# Patient Record
Sex: FEMALE | Race: WHITE | NOT HISPANIC OR LATINO | Employment: OTHER | URBAN - METROPOLITAN AREA
[De-identification: names, ages, dates, MRNs, and addresses within clinical notes are randomized per-mention and may not be internally consistent; named-entity substitution may affect disease eponyms.]

---

## 2017-07-13 ENCOUNTER — HOSPITAL ENCOUNTER (OUTPATIENT)
Dept: RADIOLOGY | Facility: MEDICAL CENTER | Age: 67
End: 2017-07-13
Attending: FAMILY MEDICINE
Payer: COMMERCIAL

## 2017-07-13 DIAGNOSIS — R60.0 LOCALIZED EDEMA: ICD-10-CM

## 2017-07-13 PROCEDURE — 93970 EXTREMITY STUDY: CPT

## 2017-08-26 ENCOUNTER — HOSPITAL ENCOUNTER (OUTPATIENT)
Dept: RADIOLOGY | Facility: MEDICAL CENTER | Age: 67
End: 2017-08-26
Attending: ORTHOPAEDIC SURGERY
Payer: COMMERCIAL

## 2017-08-26 DIAGNOSIS — M48.02 SPINAL STENOSIS IN CERVICAL REGION: ICD-10-CM

## 2017-08-26 DIAGNOSIS — M54.2 CERVICALGIA: ICD-10-CM

## 2017-08-26 PROCEDURE — 72141 MRI NECK SPINE W/O DYE: CPT

## 2018-03-02 ENCOUNTER — HOSPITAL ENCOUNTER (OUTPATIENT)
Dept: RADIOLOGY | Facility: MEDICAL CENTER | Age: 68
End: 2018-03-02
Attending: OBSTETRICS & GYNECOLOGY
Payer: COMMERCIAL

## 2018-03-02 ENCOUNTER — HOSPITAL ENCOUNTER (OUTPATIENT)
Dept: LAB | Facility: MEDICAL CENTER | Age: 68
End: 2018-03-02
Attending: FAMILY MEDICINE
Payer: COMMERCIAL

## 2018-03-02 DIAGNOSIS — Z12.31 VISIT FOR SCREENING MAMMOGRAM: ICD-10-CM

## 2018-03-02 LAB
ALBUMIN SERPL BCP-MCNC: 4.4 G/DL (ref 3.2–4.9)
ALBUMIN/GLOB SERPL: 2 G/DL
ALP SERPL-CCNC: 44 U/L (ref 30–99)
ALT SERPL-CCNC: 19 U/L (ref 2–50)
ANION GAP SERPL CALC-SCNC: 3 MMOL/L (ref 0–11.9)
AST SERPL-CCNC: 16 U/L (ref 12–45)
BASOPHILS # BLD AUTO: 0.5 % (ref 0–1.8)
BASOPHILS # BLD: 0.02 K/UL (ref 0–0.12)
BILIRUB SERPL-MCNC: 0.4 MG/DL (ref 0.1–1.5)
BNP SERPL-MCNC: 14 PG/ML (ref 0–100)
BUN SERPL-MCNC: 29 MG/DL (ref 8–22)
CALCIUM SERPL-MCNC: 8.9 MG/DL (ref 8.5–10.5)
CHLORIDE SERPL-SCNC: 110 MMOL/L (ref 96–112)
CHOLEST SERPL-MCNC: 267 MG/DL (ref 100–199)
CO2 SERPL-SCNC: 24 MMOL/L (ref 20–33)
CREAT SERPL-MCNC: 0.72 MG/DL (ref 0.5–1.4)
CRP SERPL HS-MCNC: 0.37 MG/DL (ref 0–0.75)
DEPRECATED D DIMER PPP IA-ACNC: <200 NG/ML(D-DU)
EOSINOPHIL # BLD AUTO: 0.16 K/UL (ref 0–0.51)
EOSINOPHIL NFR BLD: 3.6 % (ref 0–6.9)
ERYTHROCYTE [DISTWIDTH] IN BLOOD BY AUTOMATED COUNT: 45 FL (ref 35.9–50)
ERYTHROCYTE [SEDIMENTATION RATE] IN BLOOD BY WESTERGREN METHOD: 12 MM/HOUR (ref 0–30)
EST. AVERAGE GLUCOSE BLD GHB EST-MCNC: 105 MG/DL
GLOBULIN SER CALC-MCNC: 2.2 G/DL (ref 1.9–3.5)
GLUCOSE SERPL-MCNC: 113 MG/DL (ref 65–99)
HBA1C MFR BLD: 5.3 % (ref 0–5.6)
HCT VFR BLD AUTO: 38.4 % (ref 37–47)
HDLC SERPL-MCNC: 51 MG/DL
HGB BLD-MCNC: 12.8 G/DL (ref 12–16)
IMM GRANULOCYTES # BLD AUTO: 0.02 K/UL (ref 0–0.11)
IMM GRANULOCYTES NFR BLD AUTO: 0.5 % (ref 0–0.9)
LDLC SERPL CALC-MCNC: 184 MG/DL
LYMPHOCYTES # BLD AUTO: 1.52 K/UL (ref 1–4.8)
LYMPHOCYTES NFR BLD: 34.5 % (ref 22–41)
MCH RBC QN AUTO: 31.3 PG (ref 27–33)
MCHC RBC AUTO-ENTMCNC: 33.3 G/DL (ref 33.6–35)
MCV RBC AUTO: 93.9 FL (ref 81.4–97.8)
MONOCYTES # BLD AUTO: 0.3 K/UL (ref 0–0.85)
MONOCYTES NFR BLD AUTO: 6.8 % (ref 0–13.4)
NEUTROPHILS # BLD AUTO: 2.39 K/UL (ref 2–7.15)
NEUTROPHILS NFR BLD: 54.1 % (ref 44–72)
NRBC # BLD AUTO: 0 K/UL
NRBC BLD-RTO: 0 /100 WBC
PLATELET # BLD AUTO: 220 K/UL (ref 164–446)
PMV BLD AUTO: 10.1 FL (ref 9–12.9)
POTASSIUM SERPL-SCNC: 4.1 MMOL/L (ref 3.6–5.5)
PREALB SERPL-MCNC: 26 MG/DL (ref 18–38)
PROT SERPL-MCNC: 6.6 G/DL (ref 6–8.2)
RBC # BLD AUTO: 4.09 M/UL (ref 4.2–5.4)
SODIUM SERPL-SCNC: 137 MMOL/L (ref 135–145)
T4 FREE SERPL-MCNC: 0.65 NG/DL (ref 0.53–1.43)
TRIGL SERPL-MCNC: 159 MG/DL (ref 0–149)
TSH SERPL DL<=0.005 MIU/L-ACNC: 2.49 UIU/ML (ref 0.38–5.33)
WBC # BLD AUTO: 4.4 K/UL (ref 4.8–10.8)

## 2018-03-02 PROCEDURE — 36415 COLL VENOUS BLD VENIPUNCTURE: CPT

## 2018-03-02 PROCEDURE — 85379 FIBRIN DEGRADATION QUANT: CPT

## 2018-03-02 PROCEDURE — 84443 ASSAY THYROID STIM HORMONE: CPT

## 2018-03-02 PROCEDURE — 77067 SCR MAMMO BI INCL CAD: CPT

## 2018-03-02 PROCEDURE — 83036 HEMOGLOBIN GLYCOSYLATED A1C: CPT

## 2018-03-02 PROCEDURE — 83880 ASSAY OF NATRIURETIC PEPTIDE: CPT

## 2018-03-02 PROCEDURE — 84134 ASSAY OF PREALBUMIN: CPT

## 2018-03-02 PROCEDURE — 85652 RBC SED RATE AUTOMATED: CPT

## 2018-03-02 PROCEDURE — 84439 ASSAY OF FREE THYROXINE: CPT

## 2018-03-02 PROCEDURE — 80061 LIPID PANEL: CPT

## 2018-03-02 PROCEDURE — 85025 COMPLETE CBC W/AUTO DIFF WBC: CPT

## 2018-03-02 PROCEDURE — 85362 FIBRIN DEGRADATION PRODUCTS: CPT

## 2018-03-02 PROCEDURE — 80053 COMPREHEN METABOLIC PANEL: CPT

## 2018-03-02 PROCEDURE — 86140 C-REACTIVE PROTEIN: CPT

## 2018-03-04 LAB — FSP PPP-MCNC: <5 UG/ML

## 2018-03-16 ENCOUNTER — HOSPITAL ENCOUNTER (OUTPATIENT)
Dept: CARDIOLOGY | Facility: MEDICAL CENTER | Age: 68
End: 2018-03-16
Attending: FAMILY MEDICINE
Payer: COMMERCIAL

## 2018-03-16 ENCOUNTER — HOSPITAL ENCOUNTER (OUTPATIENT)
Dept: RADIOLOGY | Facility: MEDICAL CENTER | Age: 68
End: 2018-03-16
Attending: FAMILY MEDICINE
Payer: COMMERCIAL

## 2018-03-16 DIAGNOSIS — M25.552 LEFT HIP PAIN: ICD-10-CM

## 2018-03-16 DIAGNOSIS — R60.0 LOCALIZED EDEMA: ICD-10-CM

## 2018-03-16 DIAGNOSIS — G57.90 MONONEURITIS OF LOWER EXTREMITY, UNSPECIFIED LATERALITY: ICD-10-CM

## 2018-03-16 DIAGNOSIS — R60.1 ANASARCA: ICD-10-CM

## 2018-03-16 DIAGNOSIS — G57.92 MONONEURITIS OF LEFT LOWER EXTREMITY: ICD-10-CM

## 2018-03-16 DIAGNOSIS — R60.9 EDEMA, UNSPECIFIED TYPE: ICD-10-CM

## 2018-03-16 LAB
LV EJECT FRACT  99904: 60
LV EJECT FRACT MOD 2C 99903: 57.31
LV EJECT FRACT MOD 4C 99902: 70.08
LV EJECT FRACT MOD BP 99901: 62.31

## 2018-03-16 PROCEDURE — 93306 TTE W/DOPPLER COMPLETE: CPT

## 2018-03-16 PROCEDURE — 73502 X-RAY EXAM HIP UNI 2-3 VIEWS: CPT | Mod: LT

## 2018-03-16 PROCEDURE — 93922 UPR/L XTREMITY ART 2 LEVELS: CPT

## 2018-04-11 ENCOUNTER — HOSPITAL ENCOUNTER (OUTPATIENT)
Dept: RADIOLOGY | Facility: MEDICAL CENTER | Age: 68
End: 2018-04-11
Attending: FAMILY MEDICINE
Payer: COMMERCIAL

## 2018-04-11 ENCOUNTER — HOSPITAL ENCOUNTER (OUTPATIENT)
Dept: LAB | Facility: MEDICAL CENTER | Age: 68
End: 2018-04-11
Attending: FAMILY MEDICINE
Payer: COMMERCIAL

## 2018-04-11 DIAGNOSIS — E07.9 DISEASE OF THYROID GLAND: ICD-10-CM

## 2018-04-11 LAB
FERRITIN SERPL-MCNC: 11.9 NG/ML (ref 10–291)
FOLATE SERPL-MCNC: 10.7 NG/ML
IRON SERPL-MCNC: 74 UG/DL (ref 40–170)
T3FREE SERPL-MCNC: 3.12 PG/ML (ref 2.4–4.2)
T4 FREE SERPL-MCNC: 0.72 NG/DL (ref 0.53–1.43)
T4 SERPL-MCNC: 4.8 UG/DL (ref 4–12)
THYROPEROXIDASE AB SERPL-ACNC: 0.6 IU/ML (ref 0–9)
TSH SERPL DL<=0.005 MIU/L-ACNC: 3.82 UIU/ML (ref 0.38–5.33)
VIT B12 SERPL-MCNC: 292 PG/ML (ref 211–911)

## 2018-04-11 PROCEDURE — 83540 ASSAY OF IRON: CPT

## 2018-04-11 PROCEDURE — 84479 ASSAY OF THYROID (T3 OR T4): CPT

## 2018-04-11 PROCEDURE — 82746 ASSAY OF FOLIC ACID SERUM: CPT

## 2018-04-11 PROCEDURE — 76536 US EXAM OF HEAD AND NECK: CPT

## 2018-04-11 PROCEDURE — 84443 ASSAY THYROID STIM HORMONE: CPT

## 2018-04-11 PROCEDURE — 82607 VITAMIN B-12: CPT

## 2018-04-11 PROCEDURE — 82530 CORTISOL FREE: CPT

## 2018-04-11 PROCEDURE — 82728 ASSAY OF FERRITIN: CPT

## 2018-04-11 PROCEDURE — 86376 MICROSOMAL ANTIBODY EACH: CPT

## 2018-04-11 PROCEDURE — 83520 IMMUNOASSAY QUANT NOS NONAB: CPT

## 2018-04-11 PROCEDURE — 84481 FREE ASSAY (FT-3): CPT

## 2018-04-11 PROCEDURE — 84207 ASSAY OF VITAMIN B-6: CPT

## 2018-04-11 PROCEDURE — 82024 ASSAY OF ACTH: CPT

## 2018-04-11 PROCEDURE — 36415 COLL VENOUS BLD VENIPUNCTURE: CPT

## 2018-04-11 PROCEDURE — 84439 ASSAY OF FREE THYROXINE: CPT

## 2018-04-13 LAB
ACTH PLAS-MCNC: 6 PG/ML (ref 6–58)
T3RU NFR SERPL: 36 % (ref 28–41)
TSH RECEP AB SER-ACNC: <0.9 IU/L

## 2018-04-15 LAB — MISCELLANEOUS LAB RESULT MISCLAB: NORMAL

## 2018-04-17 LAB — VIT B6 SERPL-MCNC: 30.5 NMOL/L (ref 20–125)

## 2018-06-14 ENCOUNTER — APPOINTMENT (OUTPATIENT)
Dept: RADIOLOGY | Facility: MEDICAL CENTER | Age: 68
End: 2018-06-14
Attending: EMERGENCY MEDICINE
Payer: COMMERCIAL

## 2018-06-14 ENCOUNTER — HOSPITAL ENCOUNTER (OUTPATIENT)
Facility: MEDICAL CENTER | Age: 68
End: 2018-06-15
Attending: EMERGENCY MEDICINE | Admitting: HOSPITALIST
Payer: COMMERCIAL

## 2018-06-14 DIAGNOSIS — R07.9 CHEST PAIN, UNSPECIFIED TYPE: ICD-10-CM

## 2018-06-14 PROBLEM — F32.A DEPRESSION: Status: ACTIVE | Noted: 2018-06-14

## 2018-06-14 PROBLEM — K21.9 GERD (GASTROESOPHAGEAL REFLUX DISEASE): Status: ACTIVE | Noted: 2018-06-14

## 2018-06-14 PROBLEM — E78.5 HLD (HYPERLIPIDEMIA): Status: ACTIVE | Noted: 2018-06-14

## 2018-06-14 LAB
ALBUMIN SERPL BCP-MCNC: 4.2 G/DL (ref 3.2–4.9)
ALBUMIN/GLOB SERPL: 1.4 G/DL
ALP SERPL-CCNC: 47 U/L (ref 30–99)
ALT SERPL-CCNC: 21 U/L (ref 2–50)
ANION GAP SERPL CALC-SCNC: 10 MMOL/L (ref 0–11.9)
APTT PPP: 24.1 SEC (ref 24.7–36)
AST SERPL-CCNC: 21 U/L (ref 12–45)
BASOPHILS # BLD AUTO: 0.5 % (ref 0–1.8)
BASOPHILS # BLD: 0.03 K/UL (ref 0–0.12)
BILIRUB SERPL-MCNC: 0.4 MG/DL (ref 0.1–1.5)
BNP SERPL-MCNC: 17 PG/ML (ref 0–100)
BUN SERPL-MCNC: 22 MG/DL (ref 8–22)
CALCIUM SERPL-MCNC: 9.5 MG/DL (ref 8.5–10.5)
CHLORIDE SERPL-SCNC: 105 MMOL/L (ref 96–112)
CO2 SERPL-SCNC: 23 MMOL/L (ref 20–33)
CREAT SERPL-MCNC: 0.76 MG/DL (ref 0.5–1.4)
DEPRECATED D DIMER PPP IA-ACNC: <200 NG/ML(D-DU)
EKG IMPRESSION: NORMAL
EKG IMPRESSION: NORMAL
EOSINOPHIL # BLD AUTO: 0.17 K/UL (ref 0–0.51)
EOSINOPHIL NFR BLD: 2.8 % (ref 0–6.9)
ERYTHROCYTE [DISTWIDTH] IN BLOOD BY AUTOMATED COUNT: 47.6 FL (ref 35.9–50)
EST. AVERAGE GLUCOSE BLD GHB EST-MCNC: 114 MG/DL
GLOBULIN SER CALC-MCNC: 3 G/DL (ref 1.9–3.5)
GLUCOSE SERPL-MCNC: 105 MG/DL (ref 65–99)
HBA1C MFR BLD: 5.6 % (ref 0–5.6)
HCT VFR BLD AUTO: 38 % (ref 37–47)
HGB BLD-MCNC: 12.8 G/DL (ref 12–16)
IMM GRANULOCYTES # BLD AUTO: 0.02 K/UL (ref 0–0.11)
IMM GRANULOCYTES NFR BLD AUTO: 0.3 % (ref 0–0.9)
INR PPP: 0.97 (ref 0.87–1.13)
LIPASE SERPL-CCNC: 34 U/L (ref 11–82)
LYMPHOCYTES # BLD AUTO: 1.79 K/UL (ref 1–4.8)
LYMPHOCYTES NFR BLD: 29.7 % (ref 22–41)
MCH RBC QN AUTO: 31.5 PG (ref 27–33)
MCHC RBC AUTO-ENTMCNC: 33.7 G/DL (ref 33.6–35)
MCV RBC AUTO: 93.6 FL (ref 81.4–97.8)
MONOCYTES # BLD AUTO: 0.43 K/UL (ref 0–0.85)
MONOCYTES NFR BLD AUTO: 7.1 % (ref 0–13.4)
NEUTROPHILS # BLD AUTO: 3.59 K/UL (ref 2–7.15)
NEUTROPHILS NFR BLD: 59.6 % (ref 44–72)
NRBC # BLD AUTO: 0 K/UL
NRBC BLD-RTO: 0 /100 WBC
PLATELET # BLD AUTO: 226 K/UL (ref 164–446)
PMV BLD AUTO: 9.8 FL (ref 9–12.9)
POTASSIUM SERPL-SCNC: 4.3 MMOL/L (ref 3.6–5.5)
PROT SERPL-MCNC: 7.2 G/DL (ref 6–8.2)
PROTHROMBIN TIME: 12.6 SEC (ref 12–14.6)
RBC # BLD AUTO: 4.06 M/UL (ref 4.2–5.4)
SODIUM SERPL-SCNC: 138 MMOL/L (ref 135–145)
TROPONIN I SERPL-MCNC: <0.01 NG/ML (ref 0–0.04)
TSH SERPL DL<=0.005 MIU/L-ACNC: 3.35 UIU/ML (ref 0.38–5.33)
WBC # BLD AUTO: 5.8 K/UL (ref 4.8–10.8)

## 2018-06-14 PROCEDURE — 83880 ASSAY OF NATRIURETIC PEPTIDE: CPT

## 2018-06-14 PROCEDURE — 83036 HEMOGLOBIN GLYCOSYLATED A1C: CPT

## 2018-06-14 PROCEDURE — 85025 COMPLETE CBC W/AUTO DIFF WBC: CPT

## 2018-06-14 PROCEDURE — 93005 ELECTROCARDIOGRAM TRACING: CPT

## 2018-06-14 PROCEDURE — 93005 ELECTROCARDIOGRAM TRACING: CPT | Performed by: EMERGENCY MEDICINE

## 2018-06-14 PROCEDURE — 80053 COMPREHEN METABOLIC PANEL: CPT

## 2018-06-14 PROCEDURE — 93005 ELECTROCARDIOGRAM TRACING: CPT | Performed by: HOSPITALIST

## 2018-06-14 PROCEDURE — 93010 ELECTROCARDIOGRAM REPORT: CPT | Performed by: INTERNAL MEDICINE

## 2018-06-14 PROCEDURE — 99285 EMERGENCY DEPT VISIT HI MDM: CPT

## 2018-06-14 PROCEDURE — A9270 NON-COVERED ITEM OR SERVICE: HCPCS | Performed by: EMERGENCY MEDICINE

## 2018-06-14 PROCEDURE — 84443 ASSAY THYROID STIM HORMONE: CPT

## 2018-06-14 PROCEDURE — G0378 HOSPITAL OBSERVATION PER HR: HCPCS

## 2018-06-14 PROCEDURE — 85730 THROMBOPLASTIN TIME PARTIAL: CPT

## 2018-06-14 PROCEDURE — 99219 PR INITIAL OBSERVATION CARE,LEVL II: CPT | Performed by: HOSPITALIST

## 2018-06-14 PROCEDURE — A9270 NON-COVERED ITEM OR SERVICE: HCPCS | Performed by: HOSPITALIST

## 2018-06-14 PROCEDURE — 84484 ASSAY OF TROPONIN QUANT: CPT

## 2018-06-14 PROCEDURE — 83690 ASSAY OF LIPASE: CPT

## 2018-06-14 PROCEDURE — 85379 FIBRIN DEGRADATION QUANT: CPT

## 2018-06-14 PROCEDURE — 71045 X-RAY EXAM CHEST 1 VIEW: CPT

## 2018-06-14 PROCEDURE — 85610 PROTHROMBIN TIME: CPT

## 2018-06-14 PROCEDURE — 36415 COLL VENOUS BLD VENIPUNCTURE: CPT

## 2018-06-14 PROCEDURE — 700102 HCHG RX REV CODE 250 W/ 637 OVERRIDE(OP): Performed by: HOSPITALIST

## 2018-06-14 PROCEDURE — 700102 HCHG RX REV CODE 250 W/ 637 OVERRIDE(OP): Performed by: EMERGENCY MEDICINE

## 2018-06-14 RX ORDER — ONDANSETRON 4 MG/1
4 TABLET, ORALLY DISINTEGRATING ORAL EVERY 4 HOURS PRN
Status: DISCONTINUED | OUTPATIENT
Start: 2018-06-14 | End: 2018-06-15 | Stop reason: HOSPADM

## 2018-06-14 RX ORDER — VITAMIN E 268 MG
400 CAPSULE ORAL DAILY
COMMUNITY
End: 2019-10-23

## 2018-06-14 RX ORDER — LANOLIN ALCOHOL/MO/W.PET/CERES
400 CREAM (GRAM) TOPICAL DAILY
COMMUNITY
End: 2019-10-23

## 2018-06-14 RX ORDER — HEPARIN SODIUM 5000 [USP'U]/ML
5000 INJECTION, SOLUTION INTRAVENOUS; SUBCUTANEOUS EVERY 8 HOURS
Status: DISCONTINUED | OUTPATIENT
Start: 2018-06-14 | End: 2018-06-15 | Stop reason: HOSPADM

## 2018-06-14 RX ORDER — CHLORAL HYDRATE 500 MG
1000 CAPSULE ORAL DAILY
COMMUNITY
End: 2019-10-23

## 2018-06-14 RX ORDER — POLYETHYLENE GLYCOL 3350 17 G/17G
1 POWDER, FOR SOLUTION ORAL
Status: DISCONTINUED | OUTPATIENT
Start: 2018-06-14 | End: 2018-06-15 | Stop reason: HOSPADM

## 2018-06-14 RX ORDER — ASPIRIN 300 MG/1
300 SUPPOSITORY RECTAL DAILY
Status: DISCONTINUED | OUTPATIENT
Start: 2018-06-15 | End: 2018-06-15 | Stop reason: HOSPADM

## 2018-06-14 RX ORDER — OXYCODONE HYDROCHLORIDE 5 MG/1
5 TABLET ORAL
Status: DISCONTINUED | OUTPATIENT
Start: 2018-06-14 | End: 2018-06-15 | Stop reason: HOSPADM

## 2018-06-14 RX ORDER — RANITIDINE 150 MG/1
150 TABLET ORAL
COMMUNITY
End: 2019-10-23

## 2018-06-14 RX ORDER — UBIDECARENONE 75 MG
100 CAPSULE ORAL DAILY
COMMUNITY
End: 2019-10-23

## 2018-06-14 RX ORDER — FERROUS SULFATE 325(65) MG
325 TABLET ORAL DAILY
Status: DISCONTINUED | OUTPATIENT
Start: 2018-06-15 | End: 2018-06-15 | Stop reason: HOSPADM

## 2018-06-14 RX ORDER — FAMOTIDINE 20 MG/1
20 TABLET, FILM COATED ORAL
Status: DISCONTINUED | OUTPATIENT
Start: 2018-06-14 | End: 2018-06-15 | Stop reason: HOSPADM

## 2018-06-14 RX ORDER — HYDROMORPHONE HYDROCHLORIDE 2 MG/ML
0.5 INJECTION, SOLUTION INTRAMUSCULAR; INTRAVENOUS; SUBCUTANEOUS
Status: DISCONTINUED | OUTPATIENT
Start: 2018-06-14 | End: 2018-06-15 | Stop reason: HOSPADM

## 2018-06-14 RX ORDER — AMOXICILLIN 250 MG
2 CAPSULE ORAL 2 TIMES DAILY
Status: DISCONTINUED | OUTPATIENT
Start: 2018-06-14 | End: 2018-06-15 | Stop reason: HOSPADM

## 2018-06-14 RX ORDER — SERTRALINE HYDROCHLORIDE 100 MG/1
100 TABLET, FILM COATED ORAL DAILY
Status: DISCONTINUED | OUTPATIENT
Start: 2018-06-15 | End: 2018-06-15 | Stop reason: HOSPADM

## 2018-06-14 RX ORDER — BISACODYL 10 MG
10 SUPPOSITORY, RECTAL RECTAL
Status: DISCONTINUED | OUTPATIENT
Start: 2018-06-14 | End: 2018-06-15 | Stop reason: HOSPADM

## 2018-06-14 RX ORDER — FERROUS SULFATE 325(65) MG
325 TABLET ORAL DAILY
COMMUNITY
End: 2019-10-23

## 2018-06-14 RX ORDER — ASPIRIN 325 MG
325 TABLET ORAL DAILY
Status: DISCONTINUED | OUTPATIENT
Start: 2018-06-15 | End: 2018-06-15 | Stop reason: HOSPADM

## 2018-06-14 RX ORDER — ASPIRIN 300 MG/1
300 SUPPOSITORY RECTAL ONCE
Status: COMPLETED | OUTPATIENT
Start: 2018-06-14 | End: 2018-06-14

## 2018-06-14 RX ORDER — ONDANSETRON 2 MG/ML
4 INJECTION INTRAMUSCULAR; INTRAVENOUS EVERY 4 HOURS PRN
Status: DISCONTINUED | OUTPATIENT
Start: 2018-06-14 | End: 2018-06-15 | Stop reason: HOSPADM

## 2018-06-14 RX ORDER — ASPIRIN 81 MG/1
324 TABLET, CHEWABLE ORAL ONCE
Status: COMPLETED | OUTPATIENT
Start: 2018-06-14 | End: 2018-06-14

## 2018-06-14 RX ORDER — OXYCODONE HYDROCHLORIDE 10 MG/1
10 TABLET ORAL
Status: DISCONTINUED | OUTPATIENT
Start: 2018-06-14 | End: 2018-06-15 | Stop reason: HOSPADM

## 2018-06-14 RX ORDER — RANITIDINE 150 MG/1
150 TABLET ORAL
Status: DISCONTINUED | OUTPATIENT
Start: 2018-06-14 | End: 2018-06-14

## 2018-06-14 RX ORDER — ASPIRIN 81 MG/1
324 TABLET, CHEWABLE ORAL DAILY
Status: DISCONTINUED | OUTPATIENT
Start: 2018-06-15 | End: 2018-06-15 | Stop reason: HOSPADM

## 2018-06-14 RX ADMIN — LIDOCAINE HYDROCHLORIDE 15 ML: 20 SOLUTION OROPHARYNGEAL at 14:30

## 2018-06-14 RX ADMIN — ASPIRIN 324 MG: 81 TABLET, CHEWABLE ORAL at 11:31

## 2018-06-14 ASSESSMENT — ENCOUNTER SYMPTOMS
FOCAL WEAKNESS: 0
SPEECH CHANGE: 0
DIZZINESS: 0
CHILLS: 0
VOMITING: 0
BRUISES/BLEEDS EASILY: 0
DEPRESSION: 0
COUGH: 0
HEARTBURN: 1
FLANK PAIN: 0
PALPITATIONS: 0
MYALGIAS: 0
FEVER: 0
EYE DISCHARGE: 0
SENSORY CHANGE: 0
NAUSEA: 1
WEAKNESS: 0
BLURRED VISION: 0
HALLUCINATIONS: 0
ABDOMINAL PAIN: 0
SHORTNESS OF BREATH: 0
HEMOPTYSIS: 0
DOUBLE VISION: 0

## 2018-06-14 ASSESSMENT — COPD QUESTIONNAIRES
COPD SCREENING SCORE: 2
DURING THE PAST 4 WEEKS HOW MUCH DID YOU FEEL SHORT OF BREATH: NONE/LITTLE OF THE TIME
HAVE YOU SMOKED AT LEAST 100 CIGARETTES IN YOUR ENTIRE LIFE: NO/DON'T KNOW
DO YOU EVER COUGH UP ANY MUCUS OR PHLEGM?: NO/ONLY WITH OCCASIONAL COLDS OR INFECTIONS

## 2018-06-14 ASSESSMENT — LIFESTYLE VARIABLES
EVER_SMOKED: NEVER
ALCOHOL_USE: NO
SUBSTANCE_ABUSE: 0
EVER_SMOKED: NEVER

## 2018-06-14 ASSESSMENT — PATIENT HEALTH QUESTIONNAIRE - PHQ9
SUM OF ALL RESPONSES TO PHQ9 QUESTIONS 1 AND 2: 0
2. FEELING DOWN, DEPRESSED, IRRITABLE, OR HOPELESS: NOT AT ALL
1. LITTLE INTEREST OR PLEASURE IN DOING THINGS: NOT AT ALL

## 2018-06-14 NOTE — ED PROVIDER NOTES
ED Provider Note    Scribed for John Mosher M.D. by Kimi Randall. 6/14/2018  11:11 AM    Primary care provider: Bossman VALDES M.D.  Means of arrival: walk in   History obtained from: patient  History limited by: none    CHIEF COMPLAINT  Chief Complaint   Patient presents with   • Chest Pain       HPI  Chrissy Willoughby is a 68 y.o. female with a history of heartburn who presents to the Emergency Department for evaluation of chest pain onset one hour ago. She describes her pain as a pressure that was 10/10 that radiated to her back. She is not currently experiencing any chest pain but describes her chest feels tight. Patient reports associated shortness of breath secondary to pain. Patient also reports history of heartburn, and is prescribed prilosec and zantac for her symptoms. However, her current symptoms feel different than her heartburn. Patient tried to take zantac without improvement of her current symptoms. She denies any nausea. Additionally, there is possible family history of heart disease. She has been told she is borderline for diabetes and has hyperlipidemia. Patient denies any drug use or smoking but confirms occasional alcohol consumption. She also reports a history of occular migraines which used to occur roughly twice a year but patient states they have become more frequent recently.      REVIEW OF SYSTEMS  Pertinent positives include chest pain, shortness of breath. Pertinent negatives include no nausea.  All other systems reviewed and negative. C    PAST MEDICAL HISTORY   has a past medical history of Anesthesia; Bronchitis (2013); Heart burn; Hiatus hernia syndrome; High cholesterol; Indigestion; Other specified disorder of intestines; Pain; Psychiatric problem; and Renal disorder.    SURGICAL HISTORY   has a past surgical history that includes cervical disk and fusion anterior (10/20/2009); modified nirschel procedure (6/6/2013); hysterectomy laparoscopy; rotator cuff repair;  "meniscectomy; other orthopedic surgery; shoulder decompression arthroscopic (6/6/2013); tensilon test (2/19/2015); and ptosis repair (Bilateral, 7/29/2015).    SOCIAL HISTORY  Social History   Substance Use Topics   • Smoking status: Never Smoker   • Smokeless tobacco: No   • Alcohol use Yes      Comment: glass of wine 5 x per week       History   Drug Use No       FAMILY HISTORY  Possible family history of heart disease     CURRENT MEDICATIONS  Current medications can be found in the nurse's notes.    ALLERGIES  Allergies   Allergen Reactions   • Codeine Itching and Nausea       PHYSICAL EXAM  VITAL SIGNS: BP (!) 161/81   Pulse 73   Temp 36.2 °C (97.1 °F)   Resp 20   Ht 1.727 m (5' 8\")   Wt 89.8 kg (198 lb)   SpO2 98%   BMI 30.11 kg/m²     Vital signs reviewed.  Constitutional:  Mild distress  Head: Atraumatic  Mouth/Throat: Oropharynx moist  Neck: Supple  Cardiovascular: Normal rate and rhythm  Pulmonary/Chest:Clear to ausculatation  Abdominal: Mild epigastric tenderness   Musculoskeletal: Non tender  Lymphadenopathy: None  Neurological: Alert and oriented x4  Skin: Warm and dry  Psychiatric: Normal affect    LABS  Results for orders placed or performed during the hospital encounter of 06/14/18   CBC with Differential   Result Value Ref Range    Neutrophils-Polys 59.60 44.00 - 72.00 %    Lymphocytes 29.70 22.00 - 41.00 %    Monocytes 7.10 0.00 - 13.40 %    Eosinophils 2.80 0.00 - 6.90 %    Basophils 0.50 0.00 - 1.80 %    Immature Granulocytes 0.30 0.00 - 0.90 %    Neutrophils (Absolute) 3.59 2.00 - 7.15 K/uL    Lymphs (Absolute) 1.79 1.00 - 4.80 K/uL    Monos (Absolute) 0.43 0.00 - 0.85 K/uL    Eos (Absolute) 0.17 0.00 - 0.51 K/uL    Baso (Absolute) 0.03 0.00 - 0.12 K/uL    Immature Granulocytes (abs) 0.02 0.00 - 0.11 K/uL    WBC 5.8 4.8 - 10.8 K/uL    RBC 4.06 (L) 4.20 - 5.40 M/uL    Hemoglobin 12.8 12.0 - 16.0 g/dL    Hematocrit 38.0 37.0 - 47.0 %    MCV 93.6 81.4 - 97.8 fL    MCH 31.5 27.0 - 33.0 pg    " MCHC 33.7 33.6 - 35.0 g/dL    RDW 47.6 35.9 - 50.0 fL    Platelet Count 226 164 - 446 K/uL    MPV 9.8 9.0 - 12.9 fL    Nucleated RBC 0.00 /100 WBC    NRBC (Absolute) 0.00 K/uL   Complete Metabolic Panel (CMP)   Result Value Ref Range    Sodium 138 135 - 145 mmol/L    Potassium 4.3 3.6 - 5.5 mmol/L    Chloride 105 96 - 112 mmol/L    Co2 23 20 - 33 mmol/L    Anion Gap 10.0 0.0 - 11.9    Glucose 105 (H) 65 - 99 mg/dL    Bun 22 8 - 22 mg/dL    Creatinine 0.76 0.50 - 1.40 mg/dL    Calcium 9.5 8.5 - 10.5 mg/dL    AST(SGOT) 21 12 - 45 U/L    ALT(SGPT) 21 2 - 50 U/L    Alkaline Phosphatase 47 30 - 99 U/L    Total Bilirubin 0.4 0.1 - 1.5 mg/dL    Albumin 4.2 3.2 - 4.9 g/dL    Total Protein 7.2 6.0 - 8.2 g/dL    Globulin 3.0 1.9 - 3.5 g/dL    A-G Ratio 1.4 g/dL   Btype Natriuretic Peptide (BNP)   Result Value Ref Range    B Natriuretic Peptide 17 0 - 100 pg/mL   Prothrombin Time (PT/INR)   Result Value Ref Range    PT 12.6 12.0 - 14.6 sec    INR 0.97 0.87 - 1.13   APTT   Result Value Ref Range    APTT 24.1 (L) 24.7 - 36.0 sec   Lipase   Result Value Ref Range    Lipase 34 11 - 82 U/L   Troponin STAT   Result Value Ref Range    Troponin I <0.01 0.00 - 0.04 ng/mL   D-Dimer   Result Value Ref Range    D-Dimer Screen <200 <250 ng/mL(D-DU)   ESTIMATED GFR   Result Value Ref Range    GFR If African American >60 >60 mL/min/1.73 m 2    GFR If Non African American >60 >60 mL/min/1.73 m 2   EKG (NOW)   Result Value Ref Range    Report       [facility]    Test Date:  2018  Pt Name:    BA RATLIFF               Department: ER  MRN:        [id number]                      Room:  Gender:     Female                       Technician: [id number]  :        [id number]                   Requested By:ER TRIAGE PROTOCOL  Order #:    [id number]                    Reading MD:    Measurements  Intervals                                Axis  Rate:       62                           P:          56  LA:         128                           QRS:        33  QRSD:       94                           T:          52  QT:         412  QTc:        419    Interpretive Statements  SINUS RHYTHM  Compared to ECG 10/15/2009 12:24:44  Sinus bradycardia no longer present         All labs reviewed by me.    EKG  12 Lead EKG interpreted by me to show sinus rhythm at 60 bpm. Normal P waves. Normal QRS. Normal R wave progression. Normal EKG.     RADIOLOGY  DX-CHEST-PORTABLE (1 VIEW)   Final Result      Linear right basilar atelectasis.      Moderate hiatal hernia.         NM-CARDIAC STRESS TEST    (Results Pending)     The radiologist's interpretation of all radiological studies have been reviewed by me.    COURSE & MEDICAL DECISION MAKING  Pertinent Labs & Imaging studies reviewed. (See chart for details) The patient's [facility] and past medical  records were reviewed    11:11 AM - Patient seen and examined at bedside. Patient will be treated with 324mg aspirin. Ordered Dx-chest, D-dimer, CBC, CMP, BNP, PT/INR, APTT, lipase, troponin, estimated GFR to evaluate her symptoms. The differential diagnoses include but are not limited to: coronary disease, biliary colic versus pneumothorax.  Patient's initial blood work here is unremarkable.  Her d-dimer and troponin are negative.  Chest x-ray was clear.  She does have a hiatal hernia which certainly could cause chest pain in this patient.  Patient will require risk stratification and a stress test.    12:32 PM - Paged hospitalist.    12:56 PM - Spoke with the hospitalist and explained the patient's condition. Requested a stress test.    DISPOSITION:  Patient will be admitted to the CDU in guarded condition.    FINAL IMPRESSION  1. Chest pain, unspecified type          I, Kimi Randall (Angelito), richard scribing for, and in the presence of, John Mosher M.D..    Electronically signed by: Kimi Randall (Angelito), 6/14/2018    IJohn M.D. personally performed the services described in  this documentation, as scribed by Kimi Randall in my presence, and it is both accurate and complete.    The note accurately reflects work and decisions made by me.  John Mosher  6/14/2018  1:50 PM

## 2018-06-14 NOTE — ASSESSMENT & PLAN NOTE
This patient presents with chest pain normal EKG and negative troponin  She does have known hiatal hernia as well as history of reflux will trial on a GI cocktail  Trend troponin  Check echocardiogram and stress test

## 2018-06-14 NOTE — ED NOTES
Med rec completed by interview with patient at bedside  No abx in past 30 days  Allergy reviewed

## 2018-06-14 NOTE — DISCHARGE PLANNING
Care Transition Team Assessment    Information Source  Orientation : Oriented x 4  Information Given By: Patient  Who is responsible for making decisions for patient? : Patient         Elopement Risk  Legal Hold: No    Interdisciplinary Discharge Planning  Primary Care Physician: Suleiman  Lives with - Patient's Self Care Capacity: Spouse  Support Systems: Spouse / Significant Other  Able to Return to Previous ADL's: Yes  Mobility Issues: No  Prior Services: None  Patient Expects to be Discharged to:: home  Assistance Needed: No  Durable Medical Equipment: Not Applicable              Finances  Financial Barriers to Discharge: No  Prescription Coverage: Yes

## 2018-06-14 NOTE — PROGRESS NOTES
Seen pt, AOx 4. On cardiac monitor with SR. Chest pain/tightness still present per pt, but a lot better. No interventions needed as per pt, will monitor. Plan of care discussed includes Safety, labs, monitoring, stress test (diet/meds restrictions instructed) and pt understands.

## 2018-06-14 NOTE — H&P
Hospital Medicine History and Physical    Date of Service  6/14/2018    Chief Complaint  Chief Complaint   Patient presents with   • Chest Pain       History of Presenting Illness  68 y.o. female who presented 6/14/2018 with chest pain. Pain started about 1 hour ago. This pain actually started yesterday, and has progressively worsened today. She was working and developed acute onset chest pain radiating into her back. Feels like a tightness rub around around her chest at this time. She does have a history of heartburn on Prilosec and Zantac for her symptoms. However symptoms feel different than heartburn. She has some shortness of breath. She does have family history of heart disease. She does have borderline diabetes and hyperlipidemia. She has no alleviating or exacerbating factors or symptoms. Occasional alcohol consumption denies any drug or smoking cessation. She does notice some left arm numbness and tingling as well.   Primary Care Physician  Bossman VALDES M.D.    Consultants  None    Code Status  full    Review of Systems  Review of Systems   Constitutional: Negative for chills and fever.   HENT: Negative for congestion, hearing loss and tinnitus.    Eyes: Negative for blurred vision, double vision and discharge.   Respiratory: Negative for cough, hemoptysis and shortness of breath.    Cardiovascular: Positive for chest pain. Negative for palpitations and leg swelling.   Gastrointestinal: Positive for heartburn and nausea. Negative for abdominal pain and vomiting.   Genitourinary: Negative for dysuria and flank pain.   Musculoskeletal: Negative for joint pain and myalgias.   Skin: Negative for rash.   Neurological: Negative for dizziness, sensory change, speech change, focal weakness and weakness.   Endo/Heme/Allergies: Negative for environmental allergies. Does not bruise/bleed easily.   Psychiatric/Behavioral: Negative for depression, hallucinations and substance abuse.        Past Medical  History  Past Medical History:   Diagnosis Date   • Bronchitis    • Anesthesia     post op N/V, takes longer to wake up, sister woke up once during surgery,    • Heart burn     no meds   • Hiatus hernia syndrome    • High cholesterol    • Indigestion    • Other specified disorder of intestines    • Pain     neck    • Psychiatric problem     depression   controlled with medication    • Renal disorder     STONES       Surgical History  Past Surgical History:   Procedure Laterality Date   • PTOSIS REPAIR Bilateral 2015    Procedure: PTOSIS REPAIR-UPPER LID (POSTERIOR APPROACH);  Surgeon: Terence Nguyen M.D.;  Location: SURGERY SURGICAL [facility] ORS;  Service:    • TENSILON TEST  2015    Performed by Feliberto Pyle D.O. at ENDOSCOPY [facility] ORS   • MODIFIED NIRSCHEL PROCEDURE  2013    Performed by Cate Alcaraz M.D. at SURGERY SAME DAY [facility] ORS   • SHOULDER DECOMPRESSION ARTHROSCOPIC  2013    Performed by Cate Alcaraz M.D. at SURGERY SAME DAY [facility] ORS   • CERVICAL DISK AND FUSION ANTERIOR  10/20/2009    Performed by CATE ALCARAZ at SURGERY [facility] ORS   • HYSTERECTOMY LAPAROSCOPY     • MENISCECTOMY      left    • OTHER ORTHOPEDIC SURGERY      lumbar fusion anterior   • ROTATOR CUFF REPAIR      left        Medications  No current facility-administered medications on file prior to encounter.      Current Outpatient Prescriptions on File Prior to Encounter   Medication Sig Dispense Refill   • sertraline (ZOLOFT) 100 MG Tab Take 100 mg by mouth every day.         Family History  Reviewed and noncontributory    Social History  Social History   Substance Use Topics   • Smoking status: Never Smoker   • Smokeless tobacco: Not on file   • Alcohol use Yes      Comment: glass of wine 5 x per week        Allergies  Allergies   Allergen Reactions   • Codeine Itching and Nausea        Physical Exam  Laboratory   Hemodynamics  Temp (24hrs), Av.2 °C (97.1 °F), Min:36.2 °C (97.1 °F),  Max:36.2 °C (97.1 °F)   Temperature: 36.2 °C (97.1 °F)  Pulse  Av.5  Min: 66  Max: 73 Heart Rate (Monitored): 66  Blood Pressure : (!) 161/81, NIBP: 108/83      Respiratory      Respiration: 20, Pulse Oximetry: 97 %             Physical Exam   Constitutional: She is oriented to person, place, and time. She appears well-developed and well-nourished. No distress.   HENT:   Head: Normocephalic and atraumatic.   Eyes: Conjunctivae and EOM are normal. Pupils are equal, round, and reactive to light.   Neck: Normal range of motion. Neck supple. No JVD present.   Cardiovascular: Normal rate, regular rhythm, normal heart sounds and intact distal pulses.    No murmur heard.  Pulmonary/Chest: Effort normal and breath sounds normal. No respiratory distress. She has no wheezes.   Abdominal: Soft. Bowel sounds are normal. She exhibits no distension. There is no tenderness.   Musculoskeletal: Normal range of motion. She exhibits no edema.   Neurological: She is alert and oriented to person, place, and time. She exhibits normal muscle tone.   Skin: Skin is warm and dry.   Psychiatric: She has a normal mood and affect. Her behavior is normal. Judgment and thought content normal.   Nursing note and vitals reviewed.          Recent Labs      18   1050   SODIUM  138   POTASSIUM  4.3   CHLORIDE  105   CO2  23   GLUCOSE  105*   BUN  22   CREATININE  0.76   CALCIUM  9.5     Recent Labs      18   1050   ALTSGPT  21   ASTSGOT  21   ALKPHOSPHAT  47   TBILIRUBIN  0.4   LIPASE  34   GLUCOSE  105*     Recent Labs      18   1050   APTT  24.1*   INR  0.97     Recent Labs      18   1050   BNPBTYPENAT  17         Lab Results   Component Value Date    TROPONINI <0.01 2018     Urinalysis:  No results found for: SPECGRAVITY, GLUCOSEUR, KETONES, NITRITE, WBCURINE, RBCURINE, BACTERIA, EPITHELCELL     Imaging  DX-CHEST-PORTABLE (1 VIEW) [[id number]] Resulted: 18 1225   Order Status: Completed Updated: 18 1224    Narrative:       6/14/2018 11:36 AM    HISTORY/REASON FOR EXAM:  Chest Pain.      TECHNIQUE/EXAM DESCRIPTION AND NUMBER OF VIEWS:  Single portable view of the chest.    COMPARISON: 3/1/2016    FINDINGS:    The heart is not enlarged. There is linear right basilar atelectasis. No pleural effusion or pneumothorax is identified. There is a moderate hiatal hernia. Postsurgical changes are seen in the cervical spine.   Impression:       Linear right basilar atelectasis.    Moderate hiatal hernia.        Assessment/Plan     I anticipate this patient is appropriate for observation status at this time.    * Chest pain   Assessment & Plan    This patient presents with chest pain normal EKG and negative troponin  She does have known hiatal hernia as well as history of reflux will trial on a GI cocktail  Trend troponin  Check echocardiogram and stress test        HLD (hyperlipidemia)   Assessment & Plan    Not on statin would consider        GERD (gastroesophageal reflux disease)   Assessment & Plan    Resume ranitidine and Prilosec trial GI cocktail        Depression   Assessment & Plan    Continue Zoloft            VTE prophylaxis: Heparin

## 2018-06-15 ENCOUNTER — APPOINTMENT (OUTPATIENT)
Dept: RADIOLOGY | Facility: MEDICAL CENTER | Age: 68
End: 2018-06-15
Attending: HOSPITALIST
Payer: COMMERCIAL

## 2018-06-15 VITALS
RESPIRATION RATE: 16 BRPM | TEMPERATURE: 97.8 F | DIASTOLIC BLOOD PRESSURE: 61 MMHG | OXYGEN SATURATION: 96 % | SYSTOLIC BLOOD PRESSURE: 129 MMHG | HEART RATE: 69 BPM | HEIGHT: 68 IN | WEIGHT: 197.09 LBS | BODY MASS INDEX: 29.87 KG/M2

## 2018-06-15 PROBLEM — R07.9 CHEST PAIN: Status: RESOLVED | Noted: 2018-06-14 | Resolved: 2018-06-15

## 2018-06-15 LAB
ALBUMIN SERPL BCP-MCNC: 4 G/DL (ref 3.2–4.9)
ALBUMIN/GLOB SERPL: 1.7 G/DL
ALP SERPL-CCNC: 37 U/L (ref 30–99)
ALT SERPL-CCNC: 15 U/L (ref 2–50)
ANION GAP SERPL CALC-SCNC: 8 MMOL/L (ref 0–11.9)
AST SERPL-CCNC: 16 U/L (ref 12–45)
BASOPHILS # BLD AUTO: 0.4 % (ref 0–1.8)
BASOPHILS # BLD: 0.02 K/UL (ref 0–0.12)
BILIRUB SERPL-MCNC: 0.4 MG/DL (ref 0.1–1.5)
BUN SERPL-MCNC: 17 MG/DL (ref 8–22)
CALCIUM SERPL-MCNC: 8 MG/DL (ref 8.5–10.5)
CHLORIDE SERPL-SCNC: 109 MMOL/L (ref 96–112)
CHOLEST SERPL-MCNC: 228 MG/DL (ref 100–199)
CO2 SERPL-SCNC: 22 MMOL/L (ref 20–33)
CREAT SERPL-MCNC: 0.8 MG/DL (ref 0.5–1.4)
EOSINOPHIL # BLD AUTO: 0.2 K/UL (ref 0–0.51)
EOSINOPHIL NFR BLD: 3.8 % (ref 0–6.9)
ERYTHROCYTE [DISTWIDTH] IN BLOOD BY AUTOMATED COUNT: 48.9 FL (ref 35.9–50)
GLOBULIN SER CALC-MCNC: 2.4 G/DL (ref 1.9–3.5)
GLUCOSE SERPL-MCNC: 98 MG/DL (ref 65–99)
HCT VFR BLD AUTO: 38.7 % (ref 37–47)
HDLC SERPL-MCNC: 38 MG/DL
HGB BLD-MCNC: 12.8 G/DL (ref 12–16)
IMM GRANULOCYTES # BLD AUTO: 0.01 K/UL (ref 0–0.11)
IMM GRANULOCYTES NFR BLD AUTO: 0.2 % (ref 0–0.9)
LDLC SERPL CALC-MCNC: 151 MG/DL
LYMPHOCYTES # BLD AUTO: 1.8 K/UL (ref 1–4.8)
LYMPHOCYTES NFR BLD: 33.8 % (ref 22–41)
MCH RBC QN AUTO: 31.4 PG (ref 27–33)
MCHC RBC AUTO-ENTMCNC: 33.1 G/DL (ref 33.6–35)
MCV RBC AUTO: 95.1 FL (ref 81.4–97.8)
MONOCYTES # BLD AUTO: 0.42 K/UL (ref 0–0.85)
MONOCYTES NFR BLD AUTO: 7.9 % (ref 0–13.4)
NEUTROPHILS # BLD AUTO: 2.88 K/UL (ref 2–7.15)
NEUTROPHILS NFR BLD: 53.9 % (ref 44–72)
NRBC # BLD AUTO: 0 K/UL
NRBC BLD-RTO: 0 /100 WBC
PLATELET # BLD AUTO: 196 K/UL (ref 164–446)
PMV BLD AUTO: 9.3 FL (ref 9–12.9)
POTASSIUM SERPL-SCNC: 4.1 MMOL/L (ref 3.6–5.5)
PROT SERPL-MCNC: 6.4 G/DL (ref 6–8.2)
RBC # BLD AUTO: 4.07 M/UL (ref 4.2–5.4)
SODIUM SERPL-SCNC: 139 MMOL/L (ref 135–145)
TRIGL SERPL-MCNC: 196 MG/DL (ref 0–149)
WBC # BLD AUTO: 5.3 K/UL (ref 4.8–10.8)

## 2018-06-15 PROCEDURE — 85025 COMPLETE CBC W/AUTO DIFF WBC: CPT

## 2018-06-15 PROCEDURE — 80061 LIPID PANEL: CPT

## 2018-06-15 PROCEDURE — A9270 NON-COVERED ITEM OR SERVICE: HCPCS | Performed by: HOSPITALIST

## 2018-06-15 PROCEDURE — A9270 NON-COVERED ITEM OR SERVICE: HCPCS | Performed by: NURSE PRACTITIONER

## 2018-06-15 PROCEDURE — G0378 HOSPITAL OBSERVATION PER HR: HCPCS

## 2018-06-15 PROCEDURE — 80053 COMPREHEN METABOLIC PANEL: CPT

## 2018-06-15 PROCEDURE — A9502 TC99M TETROFOSMIN: HCPCS

## 2018-06-15 PROCEDURE — 99217 PR OBSERVATION CARE DISCHARGE: CPT | Performed by: HOSPITALIST

## 2018-06-15 PROCEDURE — 700102 HCHG RX REV CODE 250 W/ 637 OVERRIDE(OP): Performed by: NURSE PRACTITIONER

## 2018-06-15 PROCEDURE — 700102 HCHG RX REV CODE 250 W/ 637 OVERRIDE(OP): Performed by: HOSPITALIST

## 2018-06-15 PROCEDURE — 700111 HCHG RX REV CODE 636 W/ 250 OVERRIDE (IP)

## 2018-06-15 RX ORDER — REGADENOSON 0.08 MG/ML
INJECTION, SOLUTION INTRAVENOUS
Status: COMPLETED
Start: 2018-06-15 | End: 2018-06-15

## 2018-06-15 RX ORDER — ATORVASTATIN CALCIUM 20 MG/1
20 TABLET, FILM COATED ORAL
Qty: 30 TAB | Refills: 3 | Status: SHIPPED | OUTPATIENT
Start: 2018-06-15 | End: 2019-10-23

## 2018-06-15 RX ORDER — ATORVASTATIN CALCIUM 20 MG/1
20 TABLET, FILM COATED ORAL
Status: DISCONTINUED | OUTPATIENT
Start: 2018-06-15 | End: 2018-06-15 | Stop reason: HOSPADM

## 2018-06-15 RX ADMIN — ASPIRIN 324 MG: 81 TABLET, CHEWABLE ORAL at 08:22

## 2018-06-15 RX ADMIN — SERTRALINE 100 MG: 100 TABLET, FILM COATED ORAL at 08:22

## 2018-06-15 RX ADMIN — REGADENOSON 0.4 MG: 0.08 INJECTION, SOLUTION INTRAVENOUS at 11:21

## 2018-06-15 RX ADMIN — FERROUS SULFATE TAB 325 MG (65 MG ELEMENTAL FE) 325 MG: 325 (65 FE) TAB at 08:22

## 2018-06-15 RX ADMIN — LIDOCAINE HYDROCHLORIDE 15 ML: 20 SOLUTION OROPHARYNGEAL at 13:30

## 2018-06-15 ASSESSMENT — PAIN SCALES - GENERAL: PAINLEVEL_OUTOF10: 0

## 2018-06-15 NOTE — PROGRESS NOTES
Pt awake upon rounding, slight chest tightness but overall improved. Labs drawn and sent. No needs at this time

## 2018-06-15 NOTE — PROGRESS NOTES
Discharge instructions, medications and follow-up reviewed with pt, pt verbalized understanding and denies questions. Discharge paperwork given to pt. PIV removed, TeleBox removed, armband removed. Pt decline wheelchair. Pt walk off unit with hospital escort.

## 2018-06-15 NOTE — PROGRESS NOTES
Pt is resting. Call light within reach. On Sinus rhythm, HR is 68. Chest tightness still present, but otherwise comfortable. VS stable. No other needs at this time. Will continue to monitor

## 2018-06-15 NOTE — DISCHARGE SUMMARY
Discharge Summary    CHIEF COMPLAINT ON ADMISSION  Chief Complaint   Patient presents with   • Chest Pain       Reason for Admission  Chest Pain     Admission Date  6/14/2018    CODE STATUS  Full Code    HPI & HOSPITAL COURSE  This is a 68 y.o. female with a past medical history of GERD here with chest pain. The patient reports sudden onset of substernal chest pain radiating to her back a few hours after breakfast. On admission the patient was worked up for ACS. Troponins remained negative. EKG was stable. Cardiac stress test was negative for reversible ischemia. The patient's chest pain was completely resolved shortly after admission. She does have a history of uncontrolled acid reflux and reports difficulty in management of this. Her symptoms did appear to be related to his uncontrolled reflux and will be followed as an outpatient with her gastroenterologist. At this time patient is stable with no complaints of chest pain and is maintaining stable vital signs. She has no further need for acute intervention       Therefore, she is discharged in good and stable condition to home with close outpatient follow-up.    Discharge Date  06/15/2018    FOLLOW UP ITEMS POST DISCHARGE  The patient will follow up with her outpatient gastroenterologist for further management of her GERD. The patient will also follow with PCP in 1-2 weeks.    DISCHARGE DIAGNOSES  Principal Problem (Resolved):    Chest pain POA: Unknown  Active Problems:    Depression POA: Unknown    GERD (gastroesophageal reflux disease) POA: Unknown    HLD (hyperlipidemia) POA: Unknown      FOLLOW UP  No future appointments.      MEDICATIONS ON DISCHARGE     Medication List      START taking these medications      Instructions   atorvastatin 20 MG Tabs  Commonly known as:  LIPITOR   Take 1 Tab by mouth every bedtime.  Dose:  20 mg        CONTINUE taking these medications      Instructions   cyanocobalamin 100 MCG Tabs  Commonly known as:  VITAMIN B-12   Take 100  mcg by mouth every day.  Dose:  100 mcg     ferrous sulfate 325 (65 Fe) MG tablet   Take 325 mg by mouth every day.  Dose:  325 mg     fish oil 1000 MG Caps capsule   Take 1,000 mg by mouth every day.  Dose:  1000 mg     folic acid 400 MCG tablet  Commonly known as:  FOLVITE   Take 400 mcg by mouth every day.  Dose:  400 mcg     ICAPS AREDS 2 PO   Take 1 Cap by mouth every day.  Dose:  1 Cap     Magnesium-Potassium 250-100 MG Tabs   Take 1 Tab by mouth every day.  Dose:  1 Tab     Milk Thistle 1000 MG Caps   Take 1 Cap by mouth every day.  Dose:  1 Cap     raNITidine 150 MG Tabs  Commonly known as:  ZANTAC   Take 150 mg by mouth 2 times daily with meals as needed for Heartburn.  Dose:  150 mg     vitamin D 1000 UNIT Tabs  Commonly known as:  cholecalciferol   Take 1,000 Units by mouth every day.  Dose:  1000 Units     vitamin e 400 UNIT Caps  Commonly known as:  VITAMIN E   Take 400 Units by mouth every day.  Dose:  400 Units     ZOLOFT 100 MG Tabs  Generic drug:  sertraline   Take 100 mg by mouth every day.  Dose:  100 mg            Allergies  Allergies   Allergen Reactions   • Codeine Itching and Nausea       DIET  Orders Placed This Encounter   Procedures   • Protocol 1313 Diet Order: Reg, No Decaf, No Caffeine - Cardiac Stress Test Pharmacological     Standing Status:   Standing     Number of Occurrences:   1     Order Specific Question:   Diet:     Answer:   Regular [1]     Order Specific Question:   Miscellaneous modifications:     Answer:   No Decaf, No Caffeine(for test) [11]     Comments:   Protocol 1313 Patient to have no caffeine for 12 hours prior to exam (decaf, coffee, cola, tea, chocolate)       ACTIVITY  As tolerated.      LABORATORY  Lab Results   Component Value Date    SODIUM 139 06/15/2018    POTASSIUM 4.1 06/15/2018    CHLORIDE 109 06/15/2018    CO2 22 06/15/2018    GLUCOSE 98 06/15/2018    BUN 17 06/15/2018    CREATININE 0.80 06/15/2018        Lab Results   Component Value Date    WBC 5.3  06/15/2018    HEMOGLOBIN 12.8 06/15/2018    HEMATOCRIT 38.7 06/15/2018    PLATELETCT 196 06/15/2018        Total time of the discharge process was 36 minutes.

## 2018-06-15 NOTE — DISCHARGE INSTRUCTIONS
Discharge Instructions    Discharged to home by car with relative. Discharged via wheelchair, hospital escort: Yes.  Special equipment needed: Not Applicable    Be sure to schedule a follow-up appointment with your primary care doctor or any specialists as instructed.     Discharge Plan:   Diet Plan: Discussed  Activity Level: Discussed  Confirmed Follow up Appointment: Patient to Call and Schedule Appointment  Confirmed Symptoms Management: Discussed  Medication Reconciliation Updated: Yes  Influenza Vaccine Indication: Patient Refuses    I understand that a diet low in cholesterol, fat, and sodium is recommended for good health. Unless I have been given specific instructions below for another diet, I accept this instruction as my diet prescription.   Other diet: Regular    Special Instructions: None    · Is patient discharged on Warfarin / Coumadin?   No     Depression / Suicide Risk    As you are discharged from this [facility] facility, it is important to learn how to keep safe from harming yourself.    Recognize the warning signs:  · Abrupt changes in personality, positive or negative- including increase in energy   · Giving away possessions  · Change in eating patterns- significant weight changes-  positive or negative  · Change in sleeping patterns- unable to sleep or sleeping all the time   · Unwillingness or inability to communicate  · Depression  · Unusual sadness, discouragement and loneliness  · Talk of wanting to die  · Neglect of personal appearance   · Rebelliousness- reckless behavior  · Withdrawal from people/activities they love  · Confusion- inability to concentrate     If you or a loved one observes any of these behaviors or has concerns about self-harm, here's what you can do:  · Talk about it- your feelings and reasons for harming yourself  · Remove any means that you might use to hurt yourself (examples: pills, rope, extension cords, firearm)  · Get professional help from the community  (Mental Health, Substance Abuse, psychological counseling)  · Do not be alone:Call your Safe Contact- someone whom you trust who will be there for you.  · Call your local CRISIS HOTLINE 394-5031 or 213-918-0987  · Call your local Children's Mobile Crisis Response Team [place] (886) 761-7509 or www.[employer]  · Call the toll free National Suicide Prevention Hotlines   · National Suicide Prevention Lifeline 829-895-NFBX (7386)  · National Hope Line Network 800-SUICIDE (716-5865)

## 2018-06-29 ENCOUNTER — HOSPITAL ENCOUNTER (OUTPATIENT)
Dept: RADIOLOGY | Facility: MEDICAL CENTER | Age: 68
End: 2018-06-29
Attending: FAMILY MEDICINE
Payer: COMMERCIAL

## 2018-06-29 DIAGNOSIS — R07.2 PRECORDIAL PAIN: ICD-10-CM

## 2018-06-29 PROCEDURE — 700117 HCHG RX CONTRAST REV CODE 255: Performed by: FAMILY MEDICINE

## 2018-06-29 PROCEDURE — 71260 CT THORAX DX C+: CPT

## 2018-06-29 RX ADMIN — IOHEXOL 80 ML: 350 INJECTION, SOLUTION INTRAVENOUS at 11:33

## 2018-07-09 ENCOUNTER — HOSPITAL ENCOUNTER (OUTPATIENT)
Dept: RADIOLOGY | Facility: MEDICAL CENTER | Age: 68
End: 2018-07-09
Attending: INTERNAL MEDICINE
Payer: COMMERCIAL

## 2018-07-09 ENCOUNTER — HOSPITAL ENCOUNTER (OUTPATIENT)
Dept: LAB | Facility: MEDICAL CENTER | Age: 68
End: 2018-07-09
Attending: INTERNAL MEDICINE
Payer: COMMERCIAL

## 2018-07-09 DIAGNOSIS — K21.9 GASTROESOPHAGEAL REFLUX DISEASE, ESOPHAGITIS PRESENCE NOT SPECIFIED: ICD-10-CM

## 2018-07-09 DIAGNOSIS — R53.83 OTHER FATIGUE: ICD-10-CM

## 2018-07-09 DIAGNOSIS — R07.89 OTHER CHEST PAIN: ICD-10-CM

## 2018-07-09 PROCEDURE — A9270 NON-COVERED ITEM OR SERVICE: HCPCS | Performed by: INTERNAL MEDICINE

## 2018-07-09 PROCEDURE — 74241 DX-UPPER GI-SERIES WITH KUB: CPT

## 2018-07-09 PROCEDURE — 700101 HCHG RX REV CODE 250: Performed by: INTERNAL MEDICINE

## 2018-07-09 PROCEDURE — 700112 HCHG RX REV CODE 229: Performed by: INTERNAL MEDICINE

## 2018-07-09 PROCEDURE — 36415 COLL VENOUS BLD VENIPUNCTURE: CPT

## 2018-07-09 PROCEDURE — 83516 IMMUNOASSAY NONANTIBODY: CPT

## 2018-07-09 PROCEDURE — 82784 ASSAY IGA/IGD/IGG/IGM EACH: CPT

## 2018-07-09 RX ADMIN — ANTACID/ANTIFLATULENT 1 PACKET: 380; 550; 10; 10 GRANULE, EFFERVESCENT ORAL at 09:05

## 2018-07-09 RX ADMIN — BARIUM SULFATE 700 MG: 700 TABLET ORAL at 09:15

## 2018-07-10 LAB — IGA SERPL-MCNC: 110 MG/DL (ref 68–408)

## 2018-07-11 LAB — TTG IGA SER IA-ACNC: 0 U/ML (ref 0–3)

## 2018-08-29 ENCOUNTER — OFFICE VISIT (OUTPATIENT)
Dept: ENDOCRINOLOGY | Facility: MEDICAL CENTER | Age: 68
End: 2018-08-29
Payer: COMMERCIAL

## 2018-08-29 VITALS
HEIGHT: 68 IN | BODY MASS INDEX: 30.01 KG/M2 | HEART RATE: 94 BPM | DIASTOLIC BLOOD PRESSURE: 80 MMHG | WEIGHT: 198 LBS | SYSTOLIC BLOOD PRESSURE: 115 MMHG | OXYGEN SATURATION: 94 %

## 2018-08-29 DIAGNOSIS — R63.5 WEIGHT GAIN: ICD-10-CM

## 2018-08-29 DIAGNOSIS — R53.82 CHRONIC FATIGUE: ICD-10-CM

## 2018-08-29 PROCEDURE — 99244 OFF/OP CNSLTJ NEW/EST MOD 40: CPT | Performed by: INTERNAL MEDICINE

## 2018-08-29 NOTE — PROGRESS NOTES
New Patient Consult Note  Primary care physician: Bossman VALDES M.D.    Reason for consult: Fatigue    HPI:  Chrissy Willoughby is a 68 y.o. old patient who comes in today for evaluation of fatigue.  This has been ongoing for several years.  No etiology so far.  She is concerned that she might have these symptoms due to hypothyroidism.  Labs in March, April and June 2018 showed normal TSH, and her free T4 has been normal on labs in March and April 2018.  She has negative thyroid peroxidase antibody.  She complains of weight gain, dry skin, hair loss.  She has family history of hypothyroidism.  She has never been on any thyroid medications.    ROS:  Constitutional: Positive for fatigue and weight gain  Cardiac: No palpitations or racing heart  Resp: No shortness of breath  All other systems were reviewed and were negative.    Past Medical History:  Patient Active Problem List    Diagnosis Date Noted   • Depression 06/14/2018   • GERD (gastroesophageal reflux disease) 06/14/2018   • HLD (hyperlipidemia) 06/14/2018   • Other localized visual field defect 07/29/2015   • Tendonitis 06/06/2013       Past Surgical History:  Past Surgical History:   Procedure Laterality Date   • PTOSIS REPAIR Bilateral 7/29/2015    Procedure: PTOSIS REPAIR-UPPER LID (POSTERIOR APPROACH);  Surgeon: Terence Nguyen M.D.;  Location: [facility];  Service:    • TENSILON TEST  2/19/2015    Performed by Feliberto Pyle D.O. at ENDOSCOPY [facility] ORS   • MODIFIED NIRSCHEL PROCEDURE  6/6/2013    Performed by Cate Alcaraz M.D. at SURGERY SAME DAY [facility] ORS   • SHOULDER DECOMPRESSION ARTHROSCOPIC  6/6/2013    Performed by Cate Alcaraz M.D. at SURGERY SAME DAY [facility] ORS   • CERVICAL DISK AND FUSION ANTERIOR  10/20/2009    Performed by CATE ALCARAZ at SURGERY [facility] ORS   • HYSTERECTOMY LAPAROSCOPY     • MENISCECTOMY      left    • OTHER ORTHOPEDIC SURGERY      lumbar fusion anterior   • ROTATOR CUFF  "REPAIR      left        Allergies:  Codeine    Social History:  Social History     Social History   • Marital status:      Spouse name: N/A   • Number of children: N/A   • Years of education: N/A     Occupational History   • Not on file.     Social History Main Topics   • Smoking status: Never Smoker   • Smokeless tobacco: Never Used   • Alcohol use Yes      Comment: glass of wine 5 x per week    • Drug use: No   • Sexual activity: Not on file     Other Topics Concern   • Not on file     Social History Narrative   • No narrative on file       Family History:  History reviewed. No pertinent family history.    Medications:    Current Outpatient Prescriptions:   •  atorvastatin (LIPITOR) 20 MG Tab, Take 1 Tab by mouth every bedtime., Disp: 30 Tab, Rfl: 3  •  Milk Thistle 1000 MG Cap, Take 1 Cap by mouth every day., Disp: , Rfl:   •  Omega-3 Fatty Acids (FISH OIL) 1000 MG Cap capsule, Take 1,000 mg by mouth every day., Disp: , Rfl:   •  ferrous sulfate 325 (65 Fe) MG tablet, Take 325 mg by mouth every day., Disp: , Rfl:   •  vitamin D (CHOLECALCIFEROL) 1000 UNIT Tab, Take 1,000 Units by mouth every day., Disp: , Rfl:   •  vitamin e (VITAMIN E) 400 UNIT Cap, Take 400 Units by mouth every day., Disp: , Rfl:   •  cyanocobalamin (VITAMIN B-12) 100 MCG Tab, Take 100 mcg by mouth every day., Disp: , Rfl:   •  folic acid (FOLVITE) 400 MCG tablet, Take 400 mcg by mouth every day., Disp: , Rfl:   •  raNITidine (ZANTAC) 150 MG Tab, Take 150 mg by mouth 2 times daily with meals as needed for Heartburn., Disp: , Rfl:   •  Magnesium-Potassium 250-100 MG Tab, Take 1 Tab by mouth every day., Disp: , Rfl:   •  Multiple Vitamins-Minerals (ICAPS AREDS 2 PO), Take 1 Cap by mouth every day., Disp: , Rfl:   •  sertraline (ZOLOFT) 100 MG Tab, Take 100 mg by mouth every day., Disp: , Rfl:     Physical Examination:  Vital signs: /80   Pulse 94   Ht 1.727 m (5' 8\")   Wt 89.8 kg (198 lb)   SpO2 94%   BMI 30.11 kg/m²   General: " No apparent distress, cooperative  Eyes: No scleral icterus or discharge  ENMT: Normal on external inspection of nose, lips  Neck: No abnormal masses on inspection  Resp: Normal effort, clear to auscultation bilaterally   CVS: Regular rate and rhythm, S1 S2 normal, no murmur   Extremities: No edema  Neuro: Alert and oriented  Skin: No rash  Psych: Normal mood and affect    Assessment and Plan:    1. Chronic fatigue  · We had a thorough discussion about hypothalamus pituitary thyroid axis and its regulation, we discussed about diagnosis and treatment of hypothyroidism  · We discussed that based on her labs on 3 occasions in 2018 that she does not have hypothyroidism  · We will check 8 AM cortisol and ACTH, if cortisol is towards the lower side we will obtain cosyntropin stim elation  - CORTISOL; Future  - ACTH; Future    2. Weight gain  · We will check 24 hour urine cortisol as well  - URINE CORTISOL 24 HR, ICMA; Future  - URINE CREATININE 24 HR; Future    Thank you for allowing me to participate in the care of this patient.    Allan Escobedo M.D.  08/29/18    CC:   Bossman VALDES M.D.    This note was created using voice recognition software (Dragon). The accuracy of the dictation is limited by the abilities of the software. I have reviewed the note prior to signing, however some errors in grammar and context are still possible. If you have any questions related to this note please do not hesitate to contact our office.

## 2018-09-07 ENCOUNTER — HOSPITAL ENCOUNTER (OUTPATIENT)
Dept: LAB | Facility: MEDICAL CENTER | Age: 68
End: 2018-09-07
Attending: INTERNAL MEDICINE
Payer: COMMERCIAL

## 2018-09-07 DIAGNOSIS — R53.82 CHRONIC FATIGUE: ICD-10-CM

## 2018-09-07 LAB — CORTIS SERPL-MCNC: 5.9 UG/DL (ref 0–23)

## 2018-09-07 PROCEDURE — 36415 COLL VENOUS BLD VENIPUNCTURE: CPT

## 2018-09-07 PROCEDURE — 82533 TOTAL CORTISOL: CPT

## 2018-09-07 PROCEDURE — 82024 ASSAY OF ACTH: CPT

## 2018-09-09 LAB — ACTH PLAS-MCNC: 8 PG/ML (ref 6–58)

## 2018-09-10 ENCOUNTER — HOSPITAL ENCOUNTER (OUTPATIENT)
Facility: MEDICAL CENTER | Age: 68
End: 2018-09-10
Attending: INTERNAL MEDICINE
Payer: COMMERCIAL

## 2018-09-10 DIAGNOSIS — R63.5 WEIGHT GAIN: ICD-10-CM

## 2018-09-10 PROCEDURE — 82570 ASSAY OF URINE CREATININE: CPT

## 2018-09-10 PROCEDURE — 81050 URINALYSIS VOLUME MEASURE: CPT

## 2018-09-10 PROCEDURE — 82530 CORTISOL FREE: CPT

## 2018-09-11 LAB
CREAT 24H UR-MSRATE: 1389 MG/24 HR (ref 800–1800)
CREAT UR-MCNC: 97.5 MG/DL
SPECIMEN VOL UR: 1425 ML

## 2018-09-13 LAB
ANNOTATION COMMENT IMP: NORMAL
COLLECT DURATION TIME SPEC: 24 HRS
CORTIS F 24H UR HPLC-MCNC: 15.9 UG/L
CORTIS F 24H UR-MRATE: 22.7 UG/D
CORTIS F/CREAT 24H UR: 18.71 UG/G CRT
CREAT 24H UR-MCNC: 85 MG/DL
SPECIMEN VOL ?TM UR: NORMAL ML

## 2018-09-17 ENCOUNTER — TELEPHONE (OUTPATIENT)
Dept: ENDOCRINOLOGY | Facility: MEDICAL CENTER | Age: 68
End: 2018-09-17

## 2018-09-17 NOTE — TELEPHONE ENCOUNTER
----- Message from Allan Escobedo M.D. sent at 9/14/2018  2:09 PM PDT -----  Please fax orders for cosyntropin stimulation test to infusion clinic. Indication: low cortisol

## 2018-09-29 ENCOUNTER — HOSPITAL ENCOUNTER (OUTPATIENT)
Dept: RADIOLOGY | Facility: MEDICAL CENTER | Age: 68
End: 2018-09-29
Attending: THORACIC SURGERY (CARDIOTHORACIC VASCULAR SURGERY)
Payer: COMMERCIAL

## 2018-09-29 DIAGNOSIS — K44.9 DIAPHRAGMATIC HERNIA WITHOUT MENTION OF OBSTRUCTION OR GANGRENE: ICD-10-CM

## 2018-09-29 DIAGNOSIS — K44.9 DIAPHRAGMATIC HERNIA: ICD-10-CM

## 2018-09-29 PROCEDURE — 71046 X-RAY EXAM CHEST 2 VIEWS: CPT

## 2018-11-28 ENCOUNTER — HOSPITAL ENCOUNTER (OUTPATIENT)
Dept: LAB | Facility: MEDICAL CENTER | Age: 68
End: 2018-11-28
Attending: FAMILY MEDICINE
Payer: COMMERCIAL

## 2018-11-28 LAB
ALBUMIN SERPL BCP-MCNC: 4.3 G/DL (ref 3.2–4.9)
ALBUMIN/GLOB SERPL: 1.6 G/DL
ALP SERPL-CCNC: 58 U/L (ref 30–99)
ALT SERPL-CCNC: 21 U/L (ref 2–50)
ANION GAP SERPL CALC-SCNC: 7 MMOL/L (ref 0–11.9)
AST SERPL-CCNC: 16 U/L (ref 12–45)
BILIRUB SERPL-MCNC: 0.3 MG/DL (ref 0.1–1.5)
BUN SERPL-MCNC: 23 MG/DL (ref 8–22)
CALCIUM SERPL-MCNC: 9.1 MG/DL (ref 8.5–10.5)
CHLORIDE SERPL-SCNC: 106 MMOL/L (ref 96–112)
CO2 SERPL-SCNC: 28 MMOL/L (ref 20–33)
CREAT SERPL-MCNC: 0.89 MG/DL (ref 0.5–1.4)
GLOBULIN SER CALC-MCNC: 2.7 G/DL (ref 1.9–3.5)
GLUCOSE SERPL-MCNC: 93 MG/DL (ref 65–99)
POTASSIUM SERPL-SCNC: 4.4 MMOL/L (ref 3.6–5.5)
PROT SERPL-MCNC: 7 G/DL (ref 6–8.2)
SODIUM SERPL-SCNC: 141 MMOL/L (ref 135–145)

## 2018-11-28 PROCEDURE — 80053 COMPREHEN METABOLIC PANEL: CPT

## 2018-11-28 PROCEDURE — 36415 COLL VENOUS BLD VENIPUNCTURE: CPT

## 2018-12-03 ENCOUNTER — HOSPITAL ENCOUNTER (OUTPATIENT)
Dept: RADIOLOGY | Facility: MEDICAL CENTER | Age: 68
End: 2018-12-03
Attending: FAMILY MEDICINE
Payer: COMMERCIAL

## 2018-12-03 DIAGNOSIS — N20.0 KIDNEY STONE: ICD-10-CM

## 2018-12-03 PROCEDURE — 74176 CT ABD & PELVIS W/O CONTRAST: CPT

## 2019-02-28 ENCOUNTER — APPOINTMENT (OUTPATIENT)
Dept: RADIOLOGY | Facility: MEDICAL CENTER | Age: 69
End: 2019-02-28
Attending: EMERGENCY MEDICINE
Payer: COMMERCIAL

## 2019-02-28 ENCOUNTER — HOSPITAL ENCOUNTER (EMERGENCY)
Facility: MEDICAL CENTER | Age: 69
End: 2019-02-28
Attending: EMERGENCY MEDICINE
Payer: COMMERCIAL

## 2019-02-28 VITALS
DIASTOLIC BLOOD PRESSURE: 47 MMHG | SYSTOLIC BLOOD PRESSURE: 112 MMHG | BODY MASS INDEX: 28.73 KG/M2 | RESPIRATION RATE: 18 BRPM | TEMPERATURE: 97.2 F | HEART RATE: 64 BPM | OXYGEN SATURATION: 93 % | HEIGHT: 68 IN | WEIGHT: 189.6 LBS

## 2019-02-28 DIAGNOSIS — R19.7 DIARRHEA, UNSPECIFIED TYPE: ICD-10-CM

## 2019-02-28 DIAGNOSIS — R10.31 RIGHT LOWER QUADRANT ABDOMINAL PAIN: ICD-10-CM

## 2019-02-28 LAB
ALBUMIN SERPL BCP-MCNC: 4.6 G/DL (ref 3.2–4.9)
ALBUMIN/GLOB SERPL: 2 G/DL
ALP SERPL-CCNC: 56 U/L (ref 30–99)
ALT SERPL-CCNC: 14 U/L (ref 2–50)
ANION GAP SERPL CALC-SCNC: 7 MMOL/L (ref 0–11.9)
APPEARANCE UR: CLEAR
AST SERPL-CCNC: 15 U/L (ref 12–45)
BASOPHILS # BLD AUTO: 0.4 % (ref 0–1.8)
BASOPHILS # BLD: 0.02 K/UL (ref 0–0.12)
BILIRUB SERPL-MCNC: 0.4 MG/DL (ref 0.1–1.5)
BILIRUB UR QL STRIP.AUTO: NEGATIVE
BUN SERPL-MCNC: 24 MG/DL (ref 8–22)
CALCIUM SERPL-MCNC: 9.9 MG/DL (ref 8.5–10.5)
CHLORIDE SERPL-SCNC: 108 MMOL/L (ref 96–112)
CO2 SERPL-SCNC: 23 MMOL/L (ref 20–33)
COLOR UR: YELLOW
CREAT SERPL-MCNC: 0.79 MG/DL (ref 0.5–1.4)
EOSINOPHIL # BLD AUTO: 0.19 K/UL (ref 0–0.51)
EOSINOPHIL NFR BLD: 3.8 % (ref 0–6.9)
ERYTHROCYTE [DISTWIDTH] IN BLOOD BY AUTOMATED COUNT: 47.6 FL (ref 35.9–50)
GLOBULIN SER CALC-MCNC: 2.3 G/DL (ref 1.9–3.5)
GLUCOSE SERPL-MCNC: 92 MG/DL (ref 65–99)
GLUCOSE UR STRIP.AUTO-MCNC: NEGATIVE MG/DL
HCT VFR BLD AUTO: 40.6 % (ref 37–47)
HGB BLD-MCNC: 13.4 G/DL (ref 12–16)
IMM GRANULOCYTES # BLD AUTO: 0.01 K/UL (ref 0–0.11)
IMM GRANULOCYTES NFR BLD AUTO: 0.2 % (ref 0–0.9)
KETONES UR STRIP.AUTO-MCNC: NEGATIVE MG/DL
LEUKOCYTE ESTERASE UR QL STRIP.AUTO: NEGATIVE
LIPASE SERPL-CCNC: 17 U/L (ref 11–82)
LYMPHOCYTES # BLD AUTO: 1.61 K/UL (ref 1–4.8)
LYMPHOCYTES NFR BLD: 31.9 % (ref 22–41)
MCH RBC QN AUTO: 32.4 PG (ref 27–33)
MCHC RBC AUTO-ENTMCNC: 33 G/DL (ref 33.6–35)
MCV RBC AUTO: 98.1 FL (ref 81.4–97.8)
MICRO URNS: NORMAL
MONOCYTES # BLD AUTO: 0.48 K/UL (ref 0–0.85)
MONOCYTES NFR BLD AUTO: 9.5 % (ref 0–13.4)
NEUTROPHILS # BLD AUTO: 2.74 K/UL (ref 2–7.15)
NEUTROPHILS NFR BLD: 54.2 % (ref 44–72)
NITRITE UR QL STRIP.AUTO: NEGATIVE
NRBC # BLD AUTO: 0 K/UL
NRBC BLD-RTO: 0 /100 WBC
PH UR STRIP.AUTO: 5 [PH]
PLATELET # BLD AUTO: 204 K/UL (ref 164–446)
PMV BLD AUTO: 9.7 FL (ref 9–12.9)
POTASSIUM SERPL-SCNC: 3.9 MMOL/L (ref 3.6–5.5)
PROT SERPL-MCNC: 6.9 G/DL (ref 6–8.2)
PROT UR QL STRIP: NEGATIVE MG/DL
RBC # BLD AUTO: 4.14 M/UL (ref 4.2–5.4)
RBC UR QL AUTO: NEGATIVE
SODIUM SERPL-SCNC: 138 MMOL/L (ref 135–145)
SP GR UR STRIP.AUTO: 1.01
UROBILINOGEN UR STRIP.AUTO-MCNC: 0.2 MG/DL
WBC # BLD AUTO: 5.1 K/UL (ref 4.8–10.8)

## 2019-02-28 PROCEDURE — 700111 HCHG RX REV CODE 636 W/ 250 OVERRIDE (IP): Performed by: EMERGENCY MEDICINE

## 2019-02-28 PROCEDURE — 80053 COMPREHEN METABOLIC PANEL: CPT

## 2019-02-28 PROCEDURE — 81003 URINALYSIS AUTO W/O SCOPE: CPT

## 2019-02-28 PROCEDURE — 96374 THER/PROPH/DIAG INJ IV PUSH: CPT

## 2019-02-28 PROCEDURE — 36415 COLL VENOUS BLD VENIPUNCTURE: CPT

## 2019-02-28 PROCEDURE — 85025 COMPLETE CBC W/AUTO DIFF WBC: CPT

## 2019-02-28 PROCEDURE — 74177 CT ABD & PELVIS W/CONTRAST: CPT

## 2019-02-28 PROCEDURE — 83690 ASSAY OF LIPASE: CPT

## 2019-02-28 PROCEDURE — 99285 EMERGENCY DEPT VISIT HI MDM: CPT

## 2019-02-28 PROCEDURE — 700117 HCHG RX CONTRAST REV CODE 255: Performed by: EMERGENCY MEDICINE

## 2019-02-28 RX ADMIN — FENTANYL CITRATE 50 MCG: 50 INJECTION, SOLUTION INTRAMUSCULAR; INTRAVENOUS at 13:14

## 2019-02-28 RX ADMIN — IOHEXOL 100 ML: 350 INJECTION, SOLUTION INTRAVENOUS at 15:15

## 2019-02-28 NOTE — ED PROVIDER NOTES
"ED Provider Note    Scribed for Nikolas Thomas M.D. by Martha Bateman. 2/28/2019  12:42 PM    Primary care provider: Bossman VALDES M.D.  Means of arrival: walk-in  History obtained from: patient  History limited by: none    CHIEF COMPLAINT  Chief Complaint   Patient presents with   • RLQ Pain     x 45 minutes, denies N/V   • Diarrhea     x 4 episodes \"liquid\" stool overnight, denies blood in stool       HPI  Chrissy Willoughby is a 68 y.o. female who presents to the Emergency Department for evaluation of sudden onset, severe right lower abdominal pain that began around 11AM this morning with associated diarrhea that began last night. Diarrhea is described as liquid stool. No complaints of fever, chills, nausea, vomiting, melena, hematochezia and no recent antibiotic regimens. She was able to tolerate a good breakfast this morning, however, has passed no gas this morning since symptoms began.  Patient underwent abdominal surgery September 2018 for correction of complicated stomach torsion and hiatal hernia. She has been doing well since that surgery and otherwise no other abdominal surgery history aside from total hysterectomy several decades ago. Patient is up to date on her colonoscopies with no concerns with those or polyps requiring removal. She does report a significant family history of GI tract problems.     REVIEW OF SYSTEMS  Pertinent positives include abdominal pain, diarrhea. Pertinent negatives include no fever, chills, nausea, vomiting, melena, hematochezia.  All other systems reviewed and negative.    PAST MEDICAL HISTORY   has a past medical history of Anesthesia; Bronchitis (2013); Heart burn; Hiatus hernia syndrome; High cholesterol; Indigestion; Other specified disorder of intestines; Pain; Psychiatric problem; and Renal disorder.    SURGICAL HISTORY   has a past surgical history that includes cervical disk and fusion anterior (10/20/2009); modified nirschel procedure (6/6/2013); hysterectomy " "laparoscopy; rotator cuff repair; meniscectomy; other orthopedic surgery; shoulder decompression arthroscopic (6/6/2013); tensilon test (2/19/2015); and ptosis repair (Bilateral, 7/29/2015).    SOCIAL HISTORY  Social History   Substance Use Topics   • Smoking status: Never Smoker   • Smokeless tobacco: Never Used   • Alcohol use Yes      Comment: glass of wine 5 x per week       History   Drug Use No       FAMILY HISTORY  History reviewed. No pertinent family history.    CURRENT MEDICATIONS  No current facility-administered medications for this encounter.     Current Outpatient Prescriptions:   •  atorvastatin (LIPITOR) 20 MG Tab, Take 1 Tab by mouth every bedtime., Disp: 30 Tab, Rfl: 3  •  Milk Thistle 1000 MG Cap, Take 1 Cap by mouth every day., Disp: , Rfl:   •  Omega-3 Fatty Acids (FISH OIL) 1000 MG Cap capsule, Take 1,000 mg by mouth every day., Disp: , Rfl:   •  ferrous sulfate 325 (65 Fe) MG tablet, Take 325 mg by mouth every day., Disp: , Rfl:   •  vitamin D (CHOLECALCIFEROL) 1000 UNIT Tab, Take 1,000 Units by mouth every day., Disp: , Rfl:   •  vitamin e (VITAMIN E) 400 UNIT Cap, Take 400 Units by mouth every day., Disp: , Rfl:   •  cyanocobalamin (VITAMIN B-12) 100 MCG Tab, Take 100 mcg by mouth every day., Disp: , Rfl:   •  folic acid (FOLVITE) 400 MCG tablet, Take 400 mcg by mouth every day., Disp: , Rfl:   •  raNITidine (ZANTAC) 150 MG Tab, Take 150 mg by mouth 2 times daily with meals as needed for Heartburn., Disp: , Rfl:   •  Magnesium-Potassium 250-100 MG Tab, Take 1 Tab by mouth every day., Disp: , Rfl:   •  Multiple Vitamins-Minerals (ICAPS AREDS 2 PO), Take 1 Cap by mouth every day., Disp: , Rfl:   •  sertraline (ZOLOFT) 100 MG Tab, Take 100 mg by mouth every day., Disp: , Rfl:     ALLERGIES  Allergies   Allergen Reactions   • Codeine Itching and Nausea       PHYSICAL EXAM  VITAL SIGNS: /72   Pulse 72   Temp 36.2 °C (97.2 °F) (Temporal)   Resp 16   Ht 1.727 m (5' 8\")   Wt 86 kg (189 lb " 9.5 oz)   SpO2 98%   BMI 28.83 kg/m²   Pulse ox interpretation: normal  Constitutional: Well developed, Well nourished, No acute distress, Non-toxic appearance.   HENT: Normocephalic, Atraumatic, Bilateral external ears normal, Oropharynx moist, No oral exudates, Nose normal.   Eyes: PERRLA, EOMI, Conjunctiva normal, No discharge.   Neck: Normal range of motion, No tenderness, Supple, No stridor.   Lymphatic: No lymphadenopathy noted.   Cardiovascular: Normal heart rate, Normal rhythm, No murmurs, No rubs, No gallops.   Thorax & Lungs: Normal breath sounds, No respiratory distress, No wheezing, No chest tenderness.   Abdomen: Bowel sounds normal, Soft, RLQ tenderness, No masses, No pulsatile masses.   Skin: Warm, Dry, No erythema, No rash.   Extremities: Intact distal pulses, No edema, No tenderness, No cyanosis, No clubbing.   Musculoskeletal: Good range of motion in all major joints. No tenderness to palpation or major deformities noted.   Neurologic: Alert & oriented x 3, Normal motor function, Normal sensory function, No focal deficits noted.   Psychiatric: Affect normal, Judgment normal, Mood normal.       LABS  Labs Reviewed   CBC WITH DIFFERENTIAL - Abnormal; Notable for the following:        Result Value    RBC 4.14 (*)     MCV 98.1 (*)     MCHC 33.0 (*)     All other components within normal limits   COMP METABOLIC PANEL - Abnormal; Notable for the following:     Bun 24 (*)     All other components within normal limits   LIPASE   URINALYSIS,CULTURE IF INDICATED   ESTIMATED GFR     All labs reviewed by me.    RADIOLOGY  CT-ABDOMEN-PELVIS WITH   Final Result      Evaluation of the descending colon is limited as it is relatively decompressed. Mild colonic wall thickening is not excluded. No significant surrounding inflammation is seen, however, to suggest colitis.      Hepatic cyst and smaller hypodense hepatic lesions which are too small to characterize.      Atherosclerotic plaque.      Nonvisualization of  the appendix, limiting evaluation.      Spleen is at the upper limits of normal in size.           The radiologist's interpretation of all radiological studies have been reviewed by me.    COURSE & MEDICAL DECISION MAKING  Pertinent Labs & Imaging studies reviewed. (See chart for details)    12:42 PM - Patient seen and examined at bedside. Patient will be treated with 50mcg IV Fentanyl. Ordered abdomen pelvis CT, CBC, CMP, lipase, UA to evaluate her symptoms. I informed the patient that I would evaluate for acute processes with lab work and imaging. She understands and agrees with treatment plan.    3:49 PM Patient's work up at this time unremarkable for acute process requiring intervention or admission. I informed the patient of this and discussed results, advising her to follow up with her PCP. Patient understands and agrees with treatment plan and discharge.    Decision Making:  This is a 68 y.o. year old female who presents with right lower quadrant pain, diarrhea, nausea.  No fevers or vomiting.  No bloody stool or black stool.  Has a history of hiatal hernia status post repair recently.  Laboratory studies were performed that were largely unremarkable.  Due to the location and severity of the patient's right lower quadrant discomfort, CT was ordered for further evaluation.  No evidence of appendicitis although it could not be identified, and no evidence of small bowel obstruction.  No evidence of colitis.  Patient was provided with reassurance.  May be a gastrointestinal viral syndrome resulting in abdominal discomfort and diarrhea.  Appendicitis is less likely given no fever, vomiting.  No further episodes of diarrhea here in the emergency department.  No fevers or tachycardia.  No signs of peritonitis.  Patient was given IV analgesic medications with improvement in her pain symptoms.  She was informed of all the results and reports feeling improved overall and comfortable discharge.  She will return for any  worsening of her symptoms or development of any other concerning signs or symptoms however.  Cannot fully rule out an acute abdominal process such as appendicitis at this time.    The patient is stable at the time of discharge.    DISPOSITION:  Patient will be discharged home in stable condition.    FOLLOW UP:  Bossman VALDES M.D.  [address]  [place] [id number] [phone]    Schedule an appointment as soon as possible for a visit       [facility], Emergency Dept  [facility] [facility] [address] [phone]    As needed, If symptoms worsen    FINAL IMPRESSION  1. Right lower quadrant abdominal pain    2. Diarrhea, unspecified type         This dictation has been created using voice recognition software and/or scribes. The accuracy of the dictation is limited by the abilities of the software and the expertise of the scribes. I expect there may be some errors of grammar and possibly content. I made every attempt to manually correct the errors within my dictation. However, errors related to voice recognition software and/or scribes may still exist and should be interpreted within the appropriate context.    The note accurately reflects work and decisions made by me.  Nikolas Thomas  3/1/2019  12:38 PM

## 2019-02-28 NOTE — ED NOTES
".  Chief Complaint   Patient presents with   • RLQ Pain     x 45 minutes, denies N/V   • Diarrhea     x 4 episodes \"liquid\" stool overnight, denies blood in stool     ./72   Pulse 72   Temp 36.2 °C (97.2 °F) (Temporal)   Resp 16   Ht 1.727 m (5' 8\")   Wt 86 kg (189 lb 9.5 oz)   SpO2 98%   BMI 28.83 kg/m²     Ambulatory to triage with above complaints, educated on triage process, given urine specimen cup and clean catch instructions, placed in lobby, told to inform staff of any changes in condition.    "

## 2019-05-13 ENCOUNTER — HOSPITAL ENCOUNTER (OUTPATIENT)
Dept: LAB | Facility: MEDICAL CENTER | Age: 69
End: 2019-05-13
Attending: OBSTETRICS & GYNECOLOGY
Payer: COMMERCIAL

## 2019-05-13 LAB
25(OH)D3 SERPL-MCNC: 31 NG/ML (ref 30–100)
ALBUMIN SERPL BCP-MCNC: 4.4 G/DL (ref 3.2–4.9)
ALBUMIN/GLOB SERPL: 1.7 G/DL
ALP SERPL-CCNC: 57 U/L (ref 30–99)
ALT SERPL-CCNC: 15 U/L (ref 2–50)
ANION GAP SERPL CALC-SCNC: 8 MMOL/L (ref 0–11.9)
AST SERPL-CCNC: 12 U/L (ref 12–45)
BASOPHILS # BLD AUTO: 0.4 % (ref 0–1.8)
BASOPHILS # BLD: 0.02 K/UL (ref 0–0.12)
BILIRUB SERPL-MCNC: 0.4 MG/DL (ref 0.1–1.5)
BUN SERPL-MCNC: 23 MG/DL (ref 8–22)
CALCIUM SERPL-MCNC: 9 MG/DL (ref 8.5–10.5)
CHLORIDE SERPL-SCNC: 106 MMOL/L (ref 96–112)
CHOLEST SERPL-MCNC: 250 MG/DL (ref 100–199)
CO2 SERPL-SCNC: 25 MMOL/L (ref 20–33)
CREAT SERPL-MCNC: 0.74 MG/DL (ref 0.5–1.4)
EOSINOPHIL # BLD AUTO: 0.13 K/UL (ref 0–0.51)
EOSINOPHIL NFR BLD: 2.6 % (ref 0–6.9)
ERYTHROCYTE [DISTWIDTH] IN BLOOD BY AUTOMATED COUNT: 45.6 FL (ref 35.9–50)
FASTING STATUS PATIENT QL REPORTED: NORMAL
GLOBULIN SER CALC-MCNC: 2.6 G/DL (ref 1.9–3.5)
GLUCOSE SERPL-MCNC: 98 MG/DL (ref 65–99)
HCT VFR BLD AUTO: 41 % (ref 37–47)
HDLC SERPL-MCNC: 49 MG/DL
HGB BLD-MCNC: 13.7 G/DL (ref 12–16)
IMM GRANULOCYTES # BLD AUTO: 0.01 K/UL (ref 0–0.11)
IMM GRANULOCYTES NFR BLD AUTO: 0.2 % (ref 0–0.9)
LDLC SERPL CALC-MCNC: 162 MG/DL
LYMPHOCYTES # BLD AUTO: 1.47 K/UL (ref 1–4.8)
LYMPHOCYTES NFR BLD: 29.4 % (ref 22–41)
MCH RBC QN AUTO: 32.5 PG (ref 27–33)
MCHC RBC AUTO-ENTMCNC: 33.4 G/DL (ref 33.6–35)
MCV RBC AUTO: 97.2 FL (ref 81.4–97.8)
MONOCYTES # BLD AUTO: 0.32 K/UL (ref 0–0.85)
MONOCYTES NFR BLD AUTO: 6.4 % (ref 0–13.4)
NEUTROPHILS # BLD AUTO: 3.05 K/UL (ref 2–7.15)
NEUTROPHILS NFR BLD: 61 % (ref 44–72)
NRBC # BLD AUTO: 0 K/UL
NRBC BLD-RTO: 0 /100 WBC
PLATELET # BLD AUTO: 234 K/UL (ref 164–446)
PMV BLD AUTO: 10 FL (ref 9–12.9)
POTASSIUM SERPL-SCNC: 4.1 MMOL/L (ref 3.6–5.5)
PROT SERPL-MCNC: 7 G/DL (ref 6–8.2)
RBC # BLD AUTO: 4.22 M/UL (ref 4.2–5.4)
SODIUM SERPL-SCNC: 139 MMOL/L (ref 135–145)
TRIGL SERPL-MCNC: 197 MG/DL (ref 0–149)
WBC # BLD AUTO: 5 K/UL (ref 4.8–10.8)

## 2019-05-13 PROCEDURE — 80061 LIPID PANEL: CPT

## 2019-05-13 PROCEDURE — 82306 VITAMIN D 25 HYDROXY: CPT

## 2019-05-13 PROCEDURE — 84439 ASSAY OF FREE THYROXINE: CPT

## 2019-05-13 PROCEDURE — 84443 ASSAY THYROID STIM HORMONE: CPT

## 2019-05-13 PROCEDURE — 80053 COMPREHEN METABOLIC PANEL: CPT

## 2019-05-13 PROCEDURE — 85025 COMPLETE CBC W/AUTO DIFF WBC: CPT

## 2019-05-13 PROCEDURE — 36415 COLL VENOUS BLD VENIPUNCTURE: CPT

## 2019-05-14 LAB
T4 FREE SERPL-MCNC: 0.87 NG/DL (ref 0.53–1.43)
TSH SERPL DL<=0.005 MIU/L-ACNC: 2.86 UIU/ML (ref 0.38–5.33)

## 2019-08-16 ENCOUNTER — HOSPITAL ENCOUNTER (OUTPATIENT)
Dept: RADIOLOGY | Facility: MEDICAL CENTER | Age: 69
End: 2019-08-16
Attending: FAMILY MEDICINE
Payer: COMMERCIAL

## 2019-08-16 DIAGNOSIS — M25.552 LEFT HIP PAIN: ICD-10-CM

## 2019-08-16 PROCEDURE — 73502 X-RAY EXAM HIP UNI 2-3 VIEWS: CPT | Mod: LT

## 2019-08-20 ENCOUNTER — HOSPITAL ENCOUNTER (OUTPATIENT)
Dept: RADIOLOGY | Facility: MEDICAL CENTER | Age: 69
End: 2019-08-20
Attending: FAMILY MEDICINE
Payer: COMMERCIAL

## 2019-08-20 DIAGNOSIS — M86.9 SAPHO SYNDROME (HCC): ICD-10-CM

## 2019-08-20 DIAGNOSIS — L40.3 SAPHO SYNDROME (HCC): ICD-10-CM

## 2019-08-20 DIAGNOSIS — L70.9 SAPHO SYNDROME (HCC): ICD-10-CM

## 2019-08-20 DIAGNOSIS — M85.80 SAPHO SYNDROME (HCC): ICD-10-CM

## 2019-08-20 DIAGNOSIS — M65.9 SAPHO SYNDROME (HCC): ICD-10-CM

## 2019-08-20 PROCEDURE — 77080 DXA BONE DENSITY AXIAL: CPT

## 2019-10-23 DIAGNOSIS — Z01.810 PRE-OPERATIVE CARDIOVASCULAR EXAMINATION: ICD-10-CM

## 2019-10-23 DIAGNOSIS — Z01.812 PRE-OPERATIVE LABORATORY EXAMINATION: ICD-10-CM

## 2019-10-23 LAB
ANION GAP SERPL CALC-SCNC: 10 MMOL/L (ref 0–11.9)
BUN SERPL-MCNC: 26 MG/DL (ref 8–22)
CALCIUM SERPL-MCNC: 9.7 MG/DL (ref 8.5–10.5)
CHLORIDE SERPL-SCNC: 104 MMOL/L (ref 96–112)
CO2 SERPL-SCNC: 24 MMOL/L (ref 20–33)
CREAT SERPL-MCNC: 0.68 MG/DL (ref 0.5–1.4)
EKG IMPRESSION: NORMAL
GLUCOSE SERPL-MCNC: 122 MG/DL (ref 65–99)
POTASSIUM SERPL-SCNC: 4.3 MMOL/L (ref 3.6–5.5)
SODIUM SERPL-SCNC: 138 MMOL/L (ref 135–145)

## 2019-10-23 PROCEDURE — 80048 BASIC METABOLIC PNL TOTAL CA: CPT

## 2019-10-23 PROCEDURE — 93005 ELECTROCARDIOGRAM TRACING: CPT | Performed by: ANESTHESIOLOGY

## 2019-10-23 PROCEDURE — 93010 ELECTROCARDIOGRAM REPORT: CPT | Performed by: INTERNAL MEDICINE

## 2019-10-23 RX ORDER — ACETAMINOPHEN 325 MG/1
1000 TABLET ORAL 3 TIMES DAILY
Status: ON HOLD | COMMUNITY
Start: 2018-09-19 | End: 2019-11-04

## 2019-10-23 RX ORDER — ROSUVASTATIN CALCIUM 5 MG/1
TABLET, COATED ORAL EVERY EVENING
Refills: 1 | COMMUNITY
Start: 2019-08-16 | End: 2022-08-16

## 2019-10-23 RX ORDER — OMEPRAZOLE 20 MG/1
20 CAPSULE, DELAYED RELEASE ORAL PRN
COMMUNITY

## 2019-10-23 RX ORDER — TRAMADOL HYDROCHLORIDE 50 MG/1
TABLET ORAL EVERY EVENING
Refills: 0 | Status: ON HOLD | COMMUNITY
Start: 2019-10-01 | End: 2019-11-04

## 2019-10-23 SDOH — HEALTH STABILITY: MENTAL HEALTH: HOW OFTEN DO YOU HAVE A DRINK CONTAINING ALCOHOL?: 2-3 TIMES A WEEK

## 2019-10-23 NOTE — OR NURSING
"Preadmit appointment: \" Preparing for your Procedure information\" sheet given to patient with verbal and written instructions. Patient instructed to continue prescribed medications through the day before surgery, instructed to take the following medications the day of surgery per anesthesia protocol:  Tylenol, Tramadol.   Pt states, \"PONV with anesthesia and has a family history of sister having awareness during procedures\".  New Fasting guidelines handout given to Pt with Dr. Lane's NPO at 8 hours and clear liquid cutoff at 4 hours prior to surgery.  All Pt's questions and concerns answered.  "

## 2019-11-04 ENCOUNTER — APPOINTMENT (OUTPATIENT)
Dept: RADIOLOGY | Facility: MEDICAL CENTER | Age: 69
End: 2019-11-04
Attending: ORTHOPAEDIC SURGERY
Payer: COMMERCIAL

## 2019-11-04 ENCOUNTER — HOSPITAL ENCOUNTER (OUTPATIENT)
Facility: MEDICAL CENTER | Age: 69
End: 2019-11-04
Attending: ORTHOPAEDIC SURGERY | Admitting: ORTHOPAEDIC SURGERY
Payer: COMMERCIAL

## 2019-11-04 ENCOUNTER — ANESTHESIA (OUTPATIENT)
Dept: SURGERY | Facility: MEDICAL CENTER | Age: 69
End: 2019-11-04
Payer: COMMERCIAL

## 2019-11-04 ENCOUNTER — ANESTHESIA EVENT (OUTPATIENT)
Dept: SURGERY | Facility: MEDICAL CENTER | Age: 69
End: 2019-11-04
Payer: COMMERCIAL

## 2019-11-04 VITALS
OXYGEN SATURATION: 91 % | TEMPERATURE: 98.2 F | HEIGHT: 68 IN | SYSTOLIC BLOOD PRESSURE: 133 MMHG | DIASTOLIC BLOOD PRESSURE: 70 MMHG | HEART RATE: 71 BPM | WEIGHT: 185.85 LBS | RESPIRATION RATE: 16 BRPM | BODY MASS INDEX: 28.17 KG/M2

## 2019-11-04 PROCEDURE — A6222 GAUZE <=16 IN NO W/SAL W/O B: HCPCS | Performed by: ORTHOPAEDIC SURGERY

## 2019-11-04 PROCEDURE — 160036 HCHG PACU - EA ADDL 30 MINS PHASE I: Performed by: ORTHOPAEDIC SURGERY

## 2019-11-04 PROCEDURE — 160009 HCHG ANES TIME/MIN: Performed by: ORTHOPAEDIC SURGERY

## 2019-11-04 PROCEDURE — 73502 X-RAY EXAM HIP UNI 2-3 VIEWS: CPT | Mod: LT

## 2019-11-04 PROCEDURE — 160042 HCHG SURGERY MINUTES - EA ADDL 1 MIN LEVEL 5: Performed by: ORTHOPAEDIC SURGERY

## 2019-11-04 PROCEDURE — 160031 HCHG SURGERY MINUTES - 1ST 30 MINS LEVEL 5: Performed by: ORTHOPAEDIC SURGERY

## 2019-11-04 PROCEDURE — 700111 HCHG RX REV CODE 636 W/ 250 OVERRIDE (IP): Performed by: ORTHOPAEDIC SURGERY

## 2019-11-04 PROCEDURE — 160035 HCHG PACU - 1ST 60 MINS PHASE I: Performed by: ORTHOPAEDIC SURGERY

## 2019-11-04 PROCEDURE — 700111 HCHG RX REV CODE 636 W/ 250 OVERRIDE (IP): Performed by: ANESTHESIOLOGY

## 2019-11-04 PROCEDURE — 502581 HCHG PACK, SHOULDER ARTHROSCOPY: Performed by: ORTHOPAEDIC SURGERY

## 2019-11-04 PROCEDURE — 700105 HCHG RX REV CODE 258: Performed by: ORTHOPAEDIC SURGERY

## 2019-11-04 PROCEDURE — 700101 HCHG RX REV CODE 250: Performed by: ANESTHESIOLOGY

## 2019-11-04 PROCEDURE — 501445 HCHG STAPLER, SKIN DISP: Performed by: ORTHOPAEDIC SURGERY

## 2019-11-04 PROCEDURE — A4450 NON-WATERPROOF TAPE: HCPCS | Performed by: ORTHOPAEDIC SURGERY

## 2019-11-04 PROCEDURE — 160025 RECOVERY II MINUTES (STATS): Performed by: ORTHOPAEDIC SURGERY

## 2019-11-04 PROCEDURE — 501838 HCHG SUTURE GENERAL: Performed by: ORTHOPAEDIC SURGERY

## 2019-11-04 PROCEDURE — 160048 HCHG OR STATISTICAL LEVEL 1-5: Performed by: ORTHOPAEDIC SURGERY

## 2019-11-04 PROCEDURE — 160002 HCHG RECOVERY MINUTES (STAT): Performed by: ORTHOPAEDIC SURGERY

## 2019-11-04 PROCEDURE — 700102 HCHG RX REV CODE 250 W/ 637 OVERRIDE(OP): Performed by: ANESTHESIOLOGY

## 2019-11-04 PROCEDURE — A9270 NON-COVERED ITEM OR SERVICE: HCPCS | Performed by: ANESTHESIOLOGY

## 2019-11-04 PROCEDURE — 160046 HCHG PACU - 1ST 60 MINS PHASE II: Performed by: ORTHOPAEDIC SURGERY

## 2019-11-04 RX ORDER — ONDANSETRON 2 MG/ML
4 INJECTION INTRAMUSCULAR; INTRAVENOUS
Status: DISCONTINUED | OUTPATIENT
Start: 2019-11-04 | End: 2019-11-04 | Stop reason: HOSPADM

## 2019-11-04 RX ORDER — CEFAZOLIN SODIUM 1 G/3ML
INJECTION, POWDER, FOR SOLUTION INTRAMUSCULAR; INTRAVENOUS PRN
Status: DISCONTINUED | OUTPATIENT
Start: 2019-11-04 | End: 2019-11-04 | Stop reason: SURG

## 2019-11-04 RX ORDER — LIDOCAINE HYDROCHLORIDE 20 MG/ML
INJECTION, SOLUTION EPIDURAL; INFILTRATION; INTRACAUDAL; PERINEURAL PRN
Status: DISCONTINUED | OUTPATIENT
Start: 2019-11-04 | End: 2019-11-04 | Stop reason: SURG

## 2019-11-04 RX ORDER — DIPHENHYDRAMINE HYDROCHLORIDE 50 MG/ML
12.5 INJECTION INTRAMUSCULAR; INTRAVENOUS
Status: DISCONTINUED | OUTPATIENT
Start: 2019-11-04 | End: 2019-11-04 | Stop reason: HOSPADM

## 2019-11-04 RX ORDER — SODIUM CHLORIDE, SODIUM LACTATE, POTASSIUM CHLORIDE, CALCIUM CHLORIDE 600; 310; 30; 20 MG/100ML; MG/100ML; MG/100ML; MG/100ML
INJECTION, SOLUTION INTRAVENOUS CONTINUOUS
Status: DISCONTINUED | OUTPATIENT
Start: 2019-11-04 | End: 2019-11-04 | Stop reason: HOSPADM

## 2019-11-04 RX ORDER — LABETALOL HYDROCHLORIDE 5 MG/ML
5 INJECTION, SOLUTION INTRAVENOUS
Status: DISCONTINUED | OUTPATIENT
Start: 2019-11-04 | End: 2019-11-04 | Stop reason: HOSPADM

## 2019-11-04 RX ORDER — ROPIVACAINE HYDROCHLORIDE 5 MG/ML
INJECTION, SOLUTION EPIDURAL; INFILTRATION; PERINEURAL
Status: DISCONTINUED | OUTPATIENT
Start: 2019-11-04 | End: 2019-11-04 | Stop reason: HOSPADM

## 2019-11-04 RX ORDER — GABAPENTIN 300 MG/1
300 CAPSULE ORAL ONCE
Status: DISCONTINUED | OUTPATIENT
Start: 2019-11-04 | End: 2019-11-04 | Stop reason: HOSPADM

## 2019-11-04 RX ORDER — HALOPERIDOL 5 MG/ML
1 INJECTION INTRAMUSCULAR
Status: DISCONTINUED | OUTPATIENT
Start: 2019-11-04 | End: 2019-11-04 | Stop reason: HOSPADM

## 2019-11-04 RX ORDER — MIDAZOLAM HYDROCHLORIDE 1 MG/ML
1 INJECTION INTRAMUSCULAR; INTRAVENOUS
Status: DISCONTINUED | OUTPATIENT
Start: 2019-11-04 | End: 2019-11-04 | Stop reason: HOSPADM

## 2019-11-04 RX ORDER — OXYCODONE HCL 5 MG/5 ML
5 SOLUTION, ORAL ORAL
Status: COMPLETED | OUTPATIENT
Start: 2019-11-04 | End: 2019-11-04

## 2019-11-04 RX ORDER — ONDANSETRON 2 MG/ML
INJECTION INTRAMUSCULAR; INTRAVENOUS PRN
Status: DISCONTINUED | OUTPATIENT
Start: 2019-11-04 | End: 2019-11-04 | Stop reason: SURG

## 2019-11-04 RX ORDER — ACETAMINOPHEN 500 MG
1000 TABLET ORAL ONCE
Status: DISCONTINUED | OUTPATIENT
Start: 2019-11-04 | End: 2019-11-04 | Stop reason: HOSPADM

## 2019-11-04 RX ORDER — ROCURONIUM BROMIDE 10 MG/ML
INJECTION, SOLUTION INTRAVENOUS PRN
Status: DISCONTINUED | OUTPATIENT
Start: 2019-11-04 | End: 2019-11-04 | Stop reason: SURG

## 2019-11-04 RX ORDER — MEPERIDINE HYDROCHLORIDE 25 MG/ML
25 INJECTION INTRAMUSCULAR; INTRAVENOUS; SUBCUTANEOUS
Status: DISCONTINUED | OUTPATIENT
Start: 2019-11-04 | End: 2019-11-04 | Stop reason: HOSPADM

## 2019-11-04 RX ORDER — OXYCODONE HCL 5 MG/5 ML
10 SOLUTION, ORAL ORAL
Status: COMPLETED | OUTPATIENT
Start: 2019-11-04 | End: 2019-11-04

## 2019-11-04 RX ORDER — DEXAMETHASONE SODIUM PHOSPHATE 4 MG/ML
INJECTION, SOLUTION INTRA-ARTICULAR; INTRALESIONAL; INTRAMUSCULAR; INTRAVENOUS; SOFT TISSUE PRN
Status: DISCONTINUED | OUTPATIENT
Start: 2019-11-04 | End: 2019-11-04 | Stop reason: SURG

## 2019-11-04 RX ORDER — SUCCINYLCHOLINE CHLORIDE 20 MG/ML
INJECTION INTRAMUSCULAR; INTRAVENOUS PRN
Status: DISCONTINUED | OUTPATIENT
Start: 2019-11-04 | End: 2019-11-04 | Stop reason: SURG

## 2019-11-04 RX ORDER — KETOROLAC TROMETHAMINE 30 MG/ML
INJECTION, SOLUTION INTRAMUSCULAR; INTRAVENOUS PRN
Status: DISCONTINUED | OUTPATIENT
Start: 2019-11-04 | End: 2019-11-04 | Stop reason: SURG

## 2019-11-04 RX ORDER — LIDOCAINE HYDROCHLORIDE 40 MG/ML
SOLUTION TOPICAL PRN
Status: DISCONTINUED | OUTPATIENT
Start: 2019-11-04 | End: 2019-11-04 | Stop reason: SURG

## 2019-11-04 RX ORDER — MEPERIDINE HYDROCHLORIDE 25 MG/ML
INJECTION INTRAMUSCULAR; INTRAVENOUS; SUBCUTANEOUS PRN
Status: DISCONTINUED | OUTPATIENT
Start: 2019-11-04 | End: 2019-11-04 | Stop reason: SURG

## 2019-11-04 RX ADMIN — LIDOCAINE HYDROCHLORIDE 160 MG: 40 SOLUTION TOPICAL at 07:03

## 2019-11-04 RX ADMIN — OXYCODONE HYDROCHLORIDE 5 MG: 5 SOLUTION ORAL at 09:23

## 2019-11-04 RX ADMIN — SUGAMMADEX 100 MG: 100 INJECTION, SOLUTION INTRAVENOUS at 08:42

## 2019-11-04 RX ADMIN — ROCURONIUM BROMIDE 25 MG: 10 INJECTION, SOLUTION INTRAVENOUS at 07:08

## 2019-11-04 RX ADMIN — ROCURONIUM BROMIDE 10 MG: 10 INJECTION, SOLUTION INTRAVENOUS at 07:52

## 2019-11-04 RX ADMIN — ONDANSETRON 4 MG: 2 INJECTION INTRAMUSCULAR; INTRAVENOUS at 08:37

## 2019-11-04 RX ADMIN — KETOROLAC TROMETHAMINE 30 MG: 30 INJECTION, SOLUTION INTRAMUSCULAR at 07:05

## 2019-11-04 RX ADMIN — LIDOCAINE HYDROCHLORIDE 40 MG: 20 INJECTION, SOLUTION EPIDURAL; INFILTRATION; INTRACAUDAL; PERINEURAL at 07:00

## 2019-11-04 RX ADMIN — MEPERIDINE HYDROCHLORIDE 25 MG: 25 INJECTION INTRAMUSCULAR; INTRAVENOUS; SUBCUTANEOUS at 08:11

## 2019-11-04 RX ADMIN — SUCCINYLCHOLINE CHLORIDE 160 MG: 20 INJECTION, SOLUTION INTRAMUSCULAR; INTRAVENOUS at 07:02

## 2019-11-04 RX ADMIN — PROPOFOL 20 MG: 10 INJECTION, EMULSION INTRAVENOUS at 07:00

## 2019-11-04 RX ADMIN — ALFENTANIL HYDROCHLORIDE 1000 MCG: 500 INJECTION, SOLUTION INTRAVENOUS at 07:00

## 2019-11-04 RX ADMIN — CEFAZOLIN 2 G: 1 INJECTION, POWDER, FOR SOLUTION INTRAVENOUS at 06:59

## 2019-11-04 RX ADMIN — ROCURONIUM BROMIDE 5 MG: 10 INJECTION, SOLUTION INTRAVENOUS at 07:00

## 2019-11-04 RX ADMIN — EPHEDRINE SULFATE 10 MG: 50 INJECTION INTRAMUSCULAR; INTRAVENOUS; SUBCUTANEOUS at 07:11

## 2019-11-04 RX ADMIN — SODIUM CHLORIDE, POTASSIUM CHLORIDE, SODIUM LACTATE AND CALCIUM CHLORIDE: 600; 310; 30; 20 INJECTION, SOLUTION INTRAVENOUS at 06:31

## 2019-11-04 RX ADMIN — PROPOFOL 20 MG: 10 INJECTION, EMULSION INTRAVENOUS at 08:34

## 2019-11-04 RX ADMIN — DEXAMETHASONE SODIUM PHOSPHATE 4 MG: 4 INJECTION, SOLUTION INTRAMUSCULAR; INTRAVENOUS at 07:05

## 2019-11-04 RX ADMIN — MEPERIDINE HYDROCHLORIDE 25 MG: 25 INJECTION INTRAMUSCULAR; INTRAVENOUS; SUBCUTANEOUS at 09:30

## 2019-11-04 RX ADMIN — PROPOFOL 60 MG: 10 INJECTION, EMULSION INTRAVENOUS at 07:02

## 2019-11-04 ASSESSMENT — PAIN SCALES - GENERAL: PAIN_LEVEL: 0

## 2019-11-04 NOTE — OR NURSING
0847: To PACU from OR via gurney, respirations spontaneous and non-labored. Icepack applied over c/d/i L hip surgical dressings. Small shadowing marked. No pain or nausea, sleepy, but arouses to voice.   0857: Pain is tolerable, but noticeable, no nausea, more awake, tolerating decrease of supplemental O2, good pedal pulses.  0907: Pain remains tolerable, no nausea, O2 saturations on room air, just below 90%, will monitor for tolerance. Dressing CDI, small shadowing has not increased.  0923: Pain increased, no nausea, pain medication given per MAR, awake and alert, sitting up in the gurney more, tolerating it well. Tolerating room air better, saturations above 90%.  0930: Pain still not tolerable, oral pain medication given per MAR, no nausea, awake and alert.  0948: Occasional decrease of O2 sats when resting, pt helped to sit up in rney more, when pt takes deep breathes O2 saturations increase to 97-99%. Pain is tolerable, no nausea, awake and alert.  1005: O2 saturations remaining in the high 90s, pain is tolerable, no nausea, awake and alert, meets criteria to transfer to Stage 2. Report called to Brian, RN and pt readied for transfer, let Brian know of O2 saturation drop when she was resting about 15 minutes ago. He will monitor in Stage 2. Dressing remains CDI, no increase in the shadowing.

## 2019-11-04 NOTE — OR NURSING
0601: Brought patient back to pre-op and assumed care.  0633: Patient allergies and NPO status verified, home medication reconciliation completed and belongings secured. Patient verbalizes understanding of pain scale, expected course of stay and plan of care. Surgical site verified with patient. IV access established. Sequentials placed on legs.

## 2019-11-04 NOTE — ANESTHESIA TIME REPORT
Anesthesia Start and Stop Event Times     Date Time Event    11/4/2019 0639 Ready for Procedure     0659 Anesthesia Start     0849 Anesthesia Stop        Responsible Staff    No responsible staff documented.       Preop Diagnosis (Free Text):  Pre-op Diagnosis     FEMORAL ACETABULAR IMPINGEMENT        Preop Diagnosis (Codes):    Post op Diagnosis  Acetabular labrum tear, left, initial encounter      Premium Reason  Non-Premium    Comments:

## 2019-11-04 NOTE — OR NURSING
1005: To stage ll. Up to chair and dressed w/ CNA assist. Pt states having pain, but further medication would be unsafe in this nurses view. No nausea.  1028: Home care instructions reviewed w/ pt and . Questions, concerns addressed. Meets criteria for discharge.

## 2019-11-04 NOTE — ANESTHESIA PREPROCEDURE EVALUATION
Relevant Problems   GI   (+) GERD (gastroesophageal reflux disease)       Physical Exam    Airway   Mallampati: II  TM distance: >3 FB  Neck ROM: full       Cardiovascular - normal exam  Rhythm: regular  Rate: normal     Dental - normal exam         Pulmonary - normal exam  Breath sounds clear to auscultation     Abdominal    Neurological - normal exam                 Anesthesia Plan    ASA 2       Plan - general       Airway plan will be ETT        Induction: intravenous    Postoperative Plan: Postoperative administration of opioids is intended.    Pertinent diagnostic labs and testing reviewed    Informed Consent:    Anesthetic plan and risks discussed with patient.    Use of blood products discussed with: patient whom consented to blood products.

## 2019-11-04 NOTE — OP REPORT
DATE OF OPERATION: 11/4/2019    PREOPERATIVE DIAGNOSES:   1. Left hip pain.   2. Left hip Femoral Acetabular Impingement  3. Left hip Acetabular osseous metaplasia  4. Psoas tendonitis  POSTOPERATIVE DIAGNOSES:   1. Left hip pain.   2. Left hip Femoral Acetabular Impingement impingement  3. Left hip Acetabular osseous metaplasia  4.Psoas tendonitis  PROCEDURES:   1. Left hip arthroscopic debridement and chondroplasty.   2. Left hip arthroscopic peripheral compartment synovectomy.   3. Left hip femoral neck osteoplasty  4. Left hip acetabular ostoplasty   5. Left hip psoas tendon release    SURGEON: Otis Lane MD.   ASSISTANT: Laureano Casas    ANESTHESIA:  .General  ANESTHESIOLOGIST:Dr Monroe    COMPLICATIONS: None.   WEIGHTBEARING: %.     FINDINGS:   1. Stable femoral osteoplasty.   2. Stable acetabular osteoplasty  3.  Stable Psoas tendon release    PROCEDURE IN DETAIL: The patient was taken to the operating room and then underwent a general anesthetic in the supine position. Bilateral legs placed   into traction on the [place] table with the left first, then the right, traction   post eccentric into the left thigh.  The left leg was addressed with   Hibiclens, alcohol and ChloraPrep, then the field draped using sterile technique. A timeout was undertaken and noted. The surgery was initiated with gentle distraction of   the right hip. Placement of a pertrochanteric guidewire followed by an   anterolateral under direct visualization. Diagnostic arthroscopy was   Undertaken.     The lateral and anterior  labrum had degenerative tearing and was replaced with osseous metaplasia, this was taken down with a combination of punches and motorized giovanni and burs.  Decompression and acetabular osteoplasty of the anterior,  Anterolateral,  lateral and posterior lateral  acetabulum was completed with a stable decompression to return to a more normal anatomic positioning.  The remnant articular surfaces were utilized as a remnant  labrum to remain in place.  Fluoroscopic and direct localization were undertaken to obtain this.       As traction was let off, a distal anterolateral portal was made. The cannulated guide system was utilized to obtain access to the peripheral compartment. Blunt dissection was undertaken. Blade capsular incision followed with utilization of the shaver to perform a peripheral compartment synovectomy. And the head-neck osteoplasty undertaken starting first posterolaterally and then laterally and then anterolaterally.     A stable decompression was accomplished under direct and fluoroscopic visualization and range of motion.     The Psoas tendon was addressed thru a capsular incision with fluor guidance, it was transected using blade, cautery, and shaver.  Final images were obtained, a stable decompression noted. Thorough   irrigation followed with periarticular anesthetization with ropivacaine and   closure of the incisions with nylon. The sterile bandage was applied. The   patient awoken from her anesthetic and taken to the recovery room, tolerated   the procedure very well with no signs of complications.

## 2019-11-04 NOTE — DISCHARGE INSTRUCTIONS
ACTIVITY: Rest and take it easy for the first 24 hours.  A responsible adult is recommended to remain with you during that time.  It is normal to feel sleepy.  We encourage you to not do anything that requires balance, judgment or coordination.    MILD FLU-LIKE SYMPTOMS ARE NORMAL. YOU MAY EXPERIENCE GENERALIZED MUSCLE ACHES, THROAT IRRITATION, HEADACHE AND/OR SOME NAUSEA.    FOR 24 HOURS DO NOT:  Drive, operate machinery or run household appliances.  Drink beer or alcoholic beverages.   Make important decisions or sign legal documents.    SPECIAL INSTRUCTIONS:   1. Discharge (DC) to home   2. DC home on home meds   3. DC home on [place], ASA, Meloxicam   4. Outpatient Physical Therapy to begin in 2-4 days.   5.  Follow up [facility] in 10-14 days   6. Call for Fevers, drainage, redness, shortness of breath, or increasing extremity swelling particularly in the calf.   7. Start Aspirin 325mg per day. Use for thirty days.   8. Remove bandage in 5 days.  Replace sooner for drainage and notify office.   9.  Wear ESE (white compressive stocking) for 2 weeks on both lower extremities.   10. 50% Weightbearing on surgical leg with crutches.  11. May shower with wound covered starting Post-op Day # 3 (Thursday, November 7th).  12: Ambulate with assistance post-op day Zero, and then 3 times daily each subsequent day, unless instructed not to by MD.      DIET: To avoid nausea, slowly advance diet as tolerated, avoiding spicy or greasy foods for the first day.  Add more substantial food to your diet according to your physician's instructions.  Babies can be fed formula or breast milk as soon as they are hungry.  INCREASE FLUIDS AND FIBER TO AVOID CONSTIPATION.    FOLLOW-UP APPOINTMENT:  A follow-up appointment should be arranged with your doctor in; call to schedule.    You should CALL YOUR PHYSICIAN if you develop:  Fever greater than 101 degrees F.  Pain not relieved by medication, or persistent nausea or  vomiting.  Excessive bleeding (blood soaking through dressing) or unexpected drainage from the wound.  Extreme redness or swelling around the incision site, drainage of pus or foul smelling drainage.  Inability to urinate or empty your bladder within 8 hours.  Problems with breathing or chest pain.    You should call 911 if you develop problems with breathing or chest pain.  If you are unable to contact your doctor or surgical center, you should go to the nearest emergency room or urgent care center.      Dr Lane's telephone #: [phone]    If any questions arise, call your doctor.  If your doctor is not available, please feel free to call the Surgical Center at [phone].  The Center is open Monday through Friday from 7AM to 7PM.  You can also call the [employer] HOTLINE open 24 hours/day, 7 days/week and speak to a nurse at [phone], or toll free at [phone].    A registered nurse may call you a few days after your surgery to see how you are doing after your procedure.    MEDICATIONS: Resume taking daily medication.  Take prescribed pain medication with food.  If no medication is prescribed, you may take non-aspirin pain medication if needed.  PAIN MEDICATION CAN BE VERY CONSTIPATING.  Take a stool softener or laxative such as senokot, pericolace, or milk of magnesia if needed.    Prescription given prior to surgery.  Last pain medication given at 09:30 AM.    If your physician has prescribed pain medication that includes Acetaminophen (Tylenol), do not take additional Acetaminophen (Tylenol) while taking the prescribed medication.    Depression / Suicide Risk    As you are discharged from this [facility] facility, it is important to learn how to keep safe from harming yourself.    Recognize the warning signs:  · Abrupt changes in personality, positive or negative- including increase in energy   · Giving away possessions  · Change in eating patterns- significant weight changes-  positive or  negative  · Change in sleeping patterns- unable to sleep or sleeping all the time   · Unwillingness or inability to communicate  · Depression  · Unusual sadness, discouragement and loneliness  · Talk of wanting to die  · Neglect of personal appearance   · Rebelliousness- reckless behavior  · Withdrawal from people/activities they love  · Confusion- inability to concentrate     If you or a loved one observes any of these behaviors or has concerns about self-harm, here's what you can do:  · Talk about it- your feelings and reasons for harming yourself  · Remove any means that you might use to hurt yourself (examples: pills, rope, extension cords, firearm)  · Get professional help from the community (Mental Health, Substance Abuse, psychological counseling)  · Do not be alone:Call your Safe Contact- someone whom you trust who will be there for you.  · Call your local CRISIS HOTLINE 054-8390 or 580-562-1772  · Call your local Children's Mobile Crisis Response Team [place] (920) 035-6784 or www.[employer]  · Call the toll free National Suicide Prevention Hotlines   · National Suicide Prevention Lifeline 399-631-JAWK (0256)  · National Hope Line Network 800-SUICIDE (864-8632)

## 2019-11-04 NOTE — ANESTHESIA POSTPROCEDURE EVALUATION
Patient: Chrissy Willoughby    Procedure Summary     Date:  11/04/19 Room / Location:   OR  / SURGERY [facility]    Anesthesia Start:  0659 Anesthesia Stop:  0849    Procedures:       ARTHROSCOPY, HIP - W/DEBRIDEMENT, PSOAS RELEASE AND MCQUEEN (Left Hip)      SYNOVECTOMY (Left Hip)      OSTEOPLASTY, FEMUR, NECK (Left Hip)      OSTEOPLASTY, ACETABULUM (Left Hip) Diagnosis:  (FEMORAL ACETABULAR IMPINGEMENT)    Surgeon:  Otis Lane M.D. Responsible Provider:      Anesthesia Type:  general ASA Status:  2          Final Anesthesia Type: general  Last vitals  BP   Blood Pressure : 118/64    Temp   36 °C (96.8 °F)    Pulse   Heart Rate (Monitored): 95   Resp   16    SpO2   92 %      Anesthesia Post Evaluation    Patient location during evaluation: PACU  Patient participation: complete - patient participated  Level of consciousness: awake and alert  Pain score: 0    Airway patency: patent  Anesthetic complications: no  Cardiovascular status: hemodynamically stable  Respiratory status: acceptable  Hydration status: euvolemic    PONV: none           Nurse Pain Score: 5 (NPRS)

## 2020-07-14 ENCOUNTER — HOSPITAL ENCOUNTER (OUTPATIENT)
Dept: LAB | Facility: MEDICAL CENTER | Age: 70
End: 2020-07-14
Attending: OBSTETRICS & GYNECOLOGY
Payer: COMMERCIAL

## 2020-07-14 LAB
25(OH)D3 SERPL-MCNC: 44 NG/ML (ref 30–100)
ALBUMIN SERPL BCP-MCNC: 4.5 G/DL (ref 3.2–4.9)
ALBUMIN/GLOB SERPL: 1.7 G/DL
ALP SERPL-CCNC: 62 U/L (ref 30–99)
ALT SERPL-CCNC: 17 U/L (ref 2–50)
ANION GAP SERPL CALC-SCNC: 13 MMOL/L (ref 7–16)
AST SERPL-CCNC: 18 U/L (ref 12–45)
BASOPHILS # BLD AUTO: 0.3 % (ref 0–1.8)
BASOPHILS # BLD: 0.02 K/UL (ref 0–0.12)
BILIRUB SERPL-MCNC: 0.3 MG/DL (ref 0.1–1.5)
BUN SERPL-MCNC: 21 MG/DL (ref 8–22)
CALCIUM SERPL-MCNC: 9.4 MG/DL (ref 8.4–10.2)
CHLORIDE SERPL-SCNC: 106 MMOL/L (ref 96–112)
CHOLEST SERPL-MCNC: 247 MG/DL (ref 100–199)
CO2 SERPL-SCNC: 23 MMOL/L (ref 20–33)
CREAT SERPL-MCNC: 0.68 MG/DL (ref 0.5–1.4)
EOSINOPHIL # BLD AUTO: 0.18 K/UL (ref 0–0.51)
EOSINOPHIL NFR BLD: 3 % (ref 0–6.9)
ERYTHROCYTE [DISTWIDTH] IN BLOOD BY AUTOMATED COUNT: 44 FL (ref 35.9–50)
FASTING STATUS PATIENT QL REPORTED: NORMAL
GLOBULIN SER CALC-MCNC: 2.6 G/DL (ref 1.9–3.5)
GLUCOSE SERPL-MCNC: 110 MG/DL (ref 65–99)
HCT VFR BLD AUTO: 39.5 % (ref 37–47)
HDLC SERPL-MCNC: 48 MG/DL
HGB BLD-MCNC: 13 G/DL (ref 12–16)
IMM GRANULOCYTES # BLD AUTO: 0.01 K/UL (ref 0–0.11)
IMM GRANULOCYTES NFR BLD AUTO: 0.2 % (ref 0–0.9)
LDLC SERPL CALC-MCNC: 143 MG/DL
LYMPHOCYTES # BLD AUTO: 1.77 K/UL (ref 1–4.8)
LYMPHOCYTES NFR BLD: 29.7 % (ref 22–41)
MCH RBC QN AUTO: 31 PG (ref 27–33)
MCHC RBC AUTO-ENTMCNC: 32.9 G/DL (ref 33.6–35)
MCV RBC AUTO: 94.3 FL (ref 81.4–97.8)
MONOCYTES # BLD AUTO: 0.41 K/UL (ref 0–0.85)
MONOCYTES NFR BLD AUTO: 6.9 % (ref 0–13.4)
NEUTROPHILS # BLD AUTO: 3.57 K/UL (ref 2–7.15)
NEUTROPHILS NFR BLD: 59.9 % (ref 44–72)
NRBC # BLD AUTO: 0 K/UL
NRBC BLD-RTO: 0 /100 WBC
PLATELET # BLD AUTO: 225 K/UL (ref 164–446)
PMV BLD AUTO: 9.1 FL (ref 9–12.9)
POTASSIUM SERPL-SCNC: 4.4 MMOL/L (ref 3.6–5.5)
PROT SERPL-MCNC: 7.1 G/DL (ref 6–8.2)
RBC # BLD AUTO: 4.19 M/UL (ref 4.2–5.4)
SODIUM SERPL-SCNC: 142 MMOL/L (ref 135–145)
T4 FREE SERPL-MCNC: 0.96 NG/DL (ref 0.93–1.7)
TRIGL SERPL-MCNC: 279 MG/DL (ref 0–149)
TSH SERPL DL<=0.005 MIU/L-ACNC: 2.4 UIU/ML (ref 0.38–5.33)
WBC # BLD AUTO: 6 K/UL (ref 4.8–10.8)

## 2020-07-14 PROCEDURE — 80053 COMPREHEN METABOLIC PANEL: CPT

## 2020-07-14 PROCEDURE — 84439 ASSAY OF FREE THYROXINE: CPT

## 2020-07-14 PROCEDURE — 84443 ASSAY THYROID STIM HORMONE: CPT

## 2020-07-14 PROCEDURE — 85025 COMPLETE CBC W/AUTO DIFF WBC: CPT

## 2020-07-14 PROCEDURE — 82306 VITAMIN D 25 HYDROXY: CPT

## 2020-07-14 PROCEDURE — 36415 COLL VENOUS BLD VENIPUNCTURE: CPT

## 2020-07-14 PROCEDURE — 80061 LIPID PANEL: CPT

## 2020-09-04 ENCOUNTER — HOSPITAL ENCOUNTER (OUTPATIENT)
Dept: LAB | Facility: MEDICAL CENTER | Age: 70
End: 2020-09-04
Attending: FAMILY MEDICINE
Payer: COMMERCIAL

## 2020-09-04 LAB
B-OH-BUTYR SERPL-MCNC: <0.2 MMOL/L (ref 0.02–0.27)
EST. AVERAGE GLUCOSE BLD GHB EST-MCNC: 108 MG/DL
FASTING STATUS PATIENT QL REPORTED: NORMAL
GLUCOSE 2H P GLC SERPL-MCNC: 108 MG/DL (ref 70–110)
GLUCOSE SERPL-MCNC: 105 MG/DL (ref 65–99)
HBA1C MFR BLD: 5.4 % (ref 0–5.6)

## 2020-09-04 PROCEDURE — 83036 HEMOGLOBIN GLYCOSYLATED A1C: CPT

## 2020-09-04 PROCEDURE — 82010 KETONE BODYS QUAN: CPT

## 2020-09-04 PROCEDURE — 82947 ASSAY GLUCOSE BLOOD QUANT: CPT

## 2020-09-04 PROCEDURE — 36415 COLL VENOUS BLD VENIPUNCTURE: CPT

## 2020-09-04 PROCEDURE — 84681 ASSAY OF C-PEPTIDE: CPT

## 2020-09-04 PROCEDURE — 83525 ASSAY OF INSULIN: CPT

## 2020-09-06 LAB
C PEPTIDE SERPL-MCNC: 1.5 NG/ML (ref 0.8–3.5)
INSULIN P FAST SERPL-ACNC: 9 UIU/ML (ref 3–19)

## 2020-09-23 ENCOUNTER — IMMUNIZATION (OUTPATIENT)
Dept: SOCIAL WORK | Facility: CLINIC | Age: 70
End: 2020-09-23
Payer: MEDICARE

## 2020-09-23 DIAGNOSIS — Z23 NEED FOR VACCINATION: ICD-10-CM

## 2020-09-23 PROCEDURE — G0008 ADMIN INFLUENZA VIRUS VAC: HCPCS | Performed by: REGISTERED NURSE

## 2020-09-23 PROCEDURE — 90662 IIV NO PRSV INCREASED AG IM: CPT | Performed by: REGISTERED NURSE

## 2021-01-15 DIAGNOSIS — Z23 NEED FOR VACCINATION: ICD-10-CM

## 2021-04-12 ENCOUNTER — HOSPITAL ENCOUNTER (OUTPATIENT)
Dept: LAB | Facility: MEDICAL CENTER | Age: 71
End: 2021-04-12
Attending: PHYSICIAN ASSISTANT
Payer: COMMERCIAL

## 2021-04-12 LAB
ALBUMIN SERPL BCP-MCNC: 4.5 G/DL (ref 3.2–4.9)
ALBUMIN/GLOB SERPL: 1.6 G/DL
ALP SERPL-CCNC: 61 U/L (ref 30–99)
ALT SERPL-CCNC: 16 U/L (ref 2–50)
ANION GAP SERPL CALC-SCNC: 11 MMOL/L (ref 7–16)
AST SERPL-CCNC: 17 U/L (ref 12–45)
BASOPHILS # BLD AUTO: 0.4 % (ref 0–1.8)
BASOPHILS # BLD: 0.02 K/UL (ref 0–0.12)
BILIRUB SERPL-MCNC: 0.3 MG/DL (ref 0.1–1.5)
BUN SERPL-MCNC: 22 MG/DL (ref 8–22)
CALCIUM SERPL-MCNC: 8.9 MG/DL (ref 8.4–10.2)
CHLORIDE SERPL-SCNC: 106 MMOL/L (ref 96–112)
CO2 SERPL-SCNC: 24 MMOL/L (ref 20–33)
CREAT SERPL-MCNC: 0.73 MG/DL (ref 0.5–1.4)
EOSINOPHIL # BLD AUTO: 0.14 K/UL (ref 0–0.51)
EOSINOPHIL NFR BLD: 2.7 % (ref 0–6.9)
ERYTHROCYTE [DISTWIDTH] IN BLOOD BY AUTOMATED COUNT: 45.8 FL (ref 35.9–50)
FASTING STATUS PATIENT QL REPORTED: NORMAL
GLOBULIN SER CALC-MCNC: 2.8 G/DL (ref 1.9–3.5)
GLUCOSE SERPL-MCNC: 117 MG/DL (ref 65–99)
HCT VFR BLD AUTO: 40.5 % (ref 37–47)
HGB BLD-MCNC: 13.2 G/DL (ref 12–16)
IMM GRANULOCYTES # BLD AUTO: 0.02 K/UL (ref 0–0.11)
IMM GRANULOCYTES NFR BLD AUTO: 0.4 % (ref 0–0.9)
LYMPHOCYTES # BLD AUTO: 1.53 K/UL (ref 1–4.8)
LYMPHOCYTES NFR BLD: 29.4 % (ref 22–41)
MCH RBC QN AUTO: 31.4 PG (ref 27–33)
MCHC RBC AUTO-ENTMCNC: 32.6 G/DL (ref 33.6–35)
MCV RBC AUTO: 96.2 FL (ref 81.4–97.8)
MONOCYTES # BLD AUTO: 0.37 K/UL (ref 0–0.85)
MONOCYTES NFR BLD AUTO: 7.1 % (ref 0–13.4)
NEUTROPHILS # BLD AUTO: 3.13 K/UL (ref 2–7.15)
NEUTROPHILS NFR BLD: 60 % (ref 44–72)
NRBC # BLD AUTO: 0 K/UL
NRBC BLD-RTO: 0 /100 WBC
PLATELET # BLD AUTO: 232 K/UL (ref 164–446)
PMV BLD AUTO: 9.4 FL (ref 9–12.9)
POTASSIUM SERPL-SCNC: 4.3 MMOL/L (ref 3.6–5.5)
PROT SERPL-MCNC: 7.3 G/DL (ref 6–8.2)
RBC # BLD AUTO: 4.21 M/UL (ref 4.2–5.4)
SODIUM SERPL-SCNC: 141 MMOL/L (ref 135–145)
WBC # BLD AUTO: 5.2 K/UL (ref 4.8–10.8)

## 2021-04-12 PROCEDURE — 85025 COMPLETE CBC W/AUTO DIFF WBC: CPT

## 2021-04-12 PROCEDURE — 80053 COMPREHEN METABOLIC PANEL: CPT

## 2021-04-12 PROCEDURE — 36415 COLL VENOUS BLD VENIPUNCTURE: CPT

## 2021-04-14 ENCOUNTER — HOSPITAL ENCOUNTER (OUTPATIENT)
Facility: MEDICAL CENTER | Age: 71
End: 2021-04-14
Attending: PHYSICIAN ASSISTANT
Payer: COMMERCIAL

## 2021-04-14 PROCEDURE — 87899 AGENT NOS ASSAY W/OPTIC: CPT

## 2021-04-14 PROCEDURE — 87328 CRYPTOSPORIDIUM AG IA: CPT

## 2021-04-14 PROCEDURE — 83993 ASSAY FOR CALPROTECTIN FECAL: CPT

## 2021-04-14 PROCEDURE — 87493 C DIFF AMPLIFIED PROBE: CPT

## 2021-04-14 PROCEDURE — 87329 GIARDIA AG IA: CPT

## 2021-04-14 PROCEDURE — 89055 LEUKOCYTE ASSESSMENT FECAL: CPT

## 2021-04-14 PROCEDURE — 87045 FECES CULTURE AEROBIC BACT: CPT

## 2021-04-15 LAB
C DIFF DNA SPEC QL NAA+PROBE: NEGATIVE
C DIFF TOX GENS STL QL NAA+PROBE: NEGATIVE
G LAMBLIA+C PARVUM AG STL QL RAPID: NORMAL
SIGNIFICANT IND 70042: NORMAL
SITE SITE: NORMAL
SOURCE SOURCE: NORMAL
WBC STL QL MICRO: NORMAL

## 2021-04-16 ENCOUNTER — HOSPITAL ENCOUNTER (OUTPATIENT)
Dept: RADIOLOGY | Facility: MEDICAL CENTER | Age: 71
End: 2021-04-16
Attending: PHYSICIAN ASSISTANT
Payer: COMMERCIAL

## 2021-04-16 DIAGNOSIS — R19.4 CHANGE IN BOWEL HABITS: ICD-10-CM

## 2021-04-16 DIAGNOSIS — R14.0 ABDOMINAL BLOATING: ICD-10-CM

## 2021-04-16 DIAGNOSIS — R10.31 ABDOMINAL PAIN, RIGHT LOWER QUADRANT: ICD-10-CM

## 2021-04-16 DIAGNOSIS — R10.30 LOWER ABDOMINAL PAIN: ICD-10-CM

## 2021-04-16 PROCEDURE — 700117 HCHG RX CONTRAST REV CODE 255: Performed by: PHYSICIAN ASSISTANT

## 2021-04-16 PROCEDURE — 74177 CT ABD & PELVIS W/CONTRAST: CPT

## 2021-04-16 RX ADMIN — IOHEXOL 25 ML: 240 INJECTION, SOLUTION INTRATHECAL; INTRAVASCULAR; INTRAVENOUS; ORAL at 13:14

## 2021-04-16 RX ADMIN — IOHEXOL 100 ML: 350 INJECTION, SOLUTION INTRAVENOUS at 13:14

## 2021-04-17 LAB
E COLI SXT1+2 STL IA: NORMAL
SIGNIFICANT IND 70042: NORMAL
SITE SITE: NORMAL
SOURCE SOURCE: NORMAL

## 2021-04-18 LAB
BACTERIA STL CULT: NORMAL
C JEJUNI+C COLI AG STL QL: NORMAL
E COLI SXT1+2 STL IA: NORMAL
SIGNIFICANT IND 70042: NORMAL
SITE SITE: NORMAL
SOURCE SOURCE: NORMAL

## 2021-04-19 LAB — CALPROTECTIN STL-MCNT: 12 UG/G

## 2021-06-30 PROBLEM — M65.311 TRIGGER THUMB, RIGHT THUMB: Status: ACTIVE | Noted: 2021-06-30

## 2021-09-09 ENCOUNTER — APPOINTMENT (RX ONLY)
Dept: URBAN - METROPOLITAN AREA CLINIC 4 | Facility: CLINIC | Age: 71
Setting detail: DERMATOLOGY
End: 2021-09-09

## 2021-09-09 DIAGNOSIS — B00.1 HERPESVIRAL VESICULAR DERMATITIS: ICD-10-CM

## 2021-09-09 DIAGNOSIS — D22 MELANOCYTIC NEVI: ICD-10-CM

## 2021-09-09 DIAGNOSIS — D18.0 HEMANGIOMA: ICD-10-CM

## 2021-09-09 DIAGNOSIS — L81.4 OTHER MELANIN HYPERPIGMENTATION: ICD-10-CM

## 2021-09-09 DIAGNOSIS — L65.9 NONSCARRING HAIR LOSS, UNSPECIFIED: ICD-10-CM

## 2021-09-09 DIAGNOSIS — L85.3 XEROSIS CUTIS: ICD-10-CM

## 2021-09-09 DIAGNOSIS — L57.0 ACTINIC KERATOSIS: ICD-10-CM

## 2021-09-09 DIAGNOSIS — L82.1 OTHER SEBORRHEIC KERATOSIS: ICD-10-CM

## 2021-09-09 PROBLEM — D18.01 HEMANGIOMA OF SKIN AND SUBCUTANEOUS TISSUE: Status: ACTIVE | Noted: 2021-09-09

## 2021-09-09 PROBLEM — D22.5 MELANOCYTIC NEVI OF TRUNK: Status: ACTIVE | Noted: 2021-09-09

## 2021-09-09 PROBLEM — D48.5 NEOPLASM OF UNCERTAIN BEHAVIOR OF SKIN: Status: ACTIVE | Noted: 2021-09-09

## 2021-09-09 PROBLEM — D22.61 MELANOCYTIC NEVI OF RIGHT UPPER LIMB, INCLUDING SHOULDER: Status: ACTIVE | Noted: 2021-09-09

## 2021-09-09 PROBLEM — D22.71 MELANOCYTIC NEVI OF RIGHT LOWER LIMB, INCLUDING HIP: Status: ACTIVE | Noted: 2021-09-09

## 2021-09-09 PROBLEM — D22.62 MELANOCYTIC NEVI OF LEFT UPPER LIMB, INCLUDING SHOULDER: Status: ACTIVE | Noted: 2021-09-09

## 2021-09-09 PROBLEM — D22.72 MELANOCYTIC NEVI OF LEFT LOWER LIMB, INCLUDING HIP: Status: ACTIVE | Noted: 2021-09-09

## 2021-09-09 PROCEDURE — 17003 DESTRUCT PREMALG LES 2-14: CPT

## 2021-09-09 PROCEDURE — 17000 DESTRUCT PREMALG LESION: CPT | Mod: 59

## 2021-09-09 PROCEDURE — ? BIOPSY BY SHAVE METHOD

## 2021-09-09 PROCEDURE — 11102 TANGNTL BX SKIN SINGLE LES: CPT

## 2021-09-09 PROCEDURE — ? COUNSELING

## 2021-09-09 PROCEDURE — 99203 OFFICE O/P NEW LOW 30 MIN: CPT | Mod: 25

## 2021-09-09 PROCEDURE — ? PRESCRIPTION

## 2021-09-09 PROCEDURE — ? LIQUID NITROGEN

## 2021-09-09 RX ORDER — VALACYCLOVIR 1 G/1
1 TABLET, FILM COATED ORAL QD
Qty: 30 | Refills: 12 | Status: ERX | COMMUNITY
Start: 2021-09-09

## 2021-09-09 RX ADMIN — VALACYCLOVIR 1: 1 TABLET, FILM COATED ORAL at 00:00

## 2021-09-09 ASSESSMENT — LOCATION SIMPLE DESCRIPTION DERM
LOCATION SIMPLE: LEFT CHEEK
LOCATION SIMPLE: RIGHT ZYGOMA
LOCATION SIMPLE: LEFT FOREHEAD
LOCATION SIMPLE: ABDOMEN
LOCATION SIMPLE: LEFT POSTERIOR THIGH
LOCATION SIMPLE: RIGHT FOREARM
LOCATION SIMPLE: RIGHT THIGH
LOCATION SIMPLE: RIGHT PLANTAR SURFACE
LOCATION SIMPLE: RIGHT LIP
LOCATION SIMPLE: LEFT PRETIBIAL REGION
LOCATION SIMPLE: LEFT POSTERIOR UPPER ARM
LOCATION SIMPLE: RIGHT TEMPLE
LOCATION SIMPLE: LEFT LIP
LOCATION SIMPLE: RIGHT EYEBROW
LOCATION SIMPLE: RIGHT UPPER BACK
LOCATION SIMPLE: RIGHT POPLITEAL SKIN
LOCATION SIMPLE: LEFT FOREARM
LOCATION SIMPLE: RIGHT POSTERIOR UPPER ARM
LOCATION SIMPLE: CHEST
LOCATION SIMPLE: LEFT POPLITEAL SKIN
LOCATION SIMPLE: LEFT TEMPLE
LOCATION SIMPLE: LEFT PLANTAR SURFACE
LOCATION SIMPLE: RIGHT POSTERIOR THIGH
LOCATION SIMPLE: RIGHT PRETIBIAL REGION
LOCATION SIMPLE: LEFT EYEBROW
LOCATION SIMPLE: UPPER BACK
LOCATION SIMPLE: LEFT ELBOW

## 2021-09-09 ASSESSMENT — LOCATION DETAILED DESCRIPTION DERM
LOCATION DETAILED: LEFT DISTAL POSTERIOR THIGH
LOCATION DETAILED: RIGHT DISTAL POSTERIOR UPPER ARM
LOCATION DETAILED: LEFT INFERIOR FOREHEAD
LOCATION DETAILED: LOWER STERNUM
LOCATION DETAILED: RIGHT VENTRAL PROXIMAL FOREARM
LOCATION DETAILED: RIGHT PROXIMAL POSTERIOR UPPER ARM
LOCATION DETAILED: LEFT MEDIAL MALAR CHEEK
LOCATION DETAILED: SUBXIPHOID
LOCATION DETAILED: LEFT DISTAL DORSAL FOREARM
LOCATION DETAILED: LEFT MEDIAL PLANTAR MIDFOOT
LOCATION DETAILED: RIGHT CENTRAL ZYGOMA
LOCATION DETAILED: LEFT CENTRAL MALAR CHEEK
LOCATION DETAILED: LEFT INFERIOR VERMILION LIP
LOCATION DETAILED: LEFT VENTRAL LATERAL PROXIMAL FOREARM
LOCATION DETAILED: MIDDLE STERNUM
LOCATION DETAILED: LEFT LATERAL EYEBROW
LOCATION DETAILED: LEFT POPLITEAL SKIN
LOCATION DETAILED: RIGHT PROXIMAL PRETIBIAL REGION
LOCATION DETAILED: LEFT MID TEMPLE
LOCATION DETAILED: LEFT PROXIMAL PRETIBIAL REGION
LOCATION DETAILED: RIGHT INFERIOR VERMILION LIP
LOCATION DETAILED: LEFT DISTAL POSTERIOR UPPER ARM
LOCATION DETAILED: INFERIOR THORACIC SPINE
LOCATION DETAILED: LEFT CENTRAL EYEBROW
LOCATION DETAILED: RIGHT DISTAL POSTERIOR THIGH
LOCATION DETAILED: RIGHT POPLITEAL SKIN
LOCATION DETAILED: LEFT INFERIOR LATERAL MALAR CHEEK
LOCATION DETAILED: RIGHT SUPERIOR UPPER BACK
LOCATION DETAILED: RIGHT LATERAL SUPERIOR CHEST
LOCATION DETAILED: RIGHT MEDIAL PLANTAR MIDFOOT
LOCATION DETAILED: LEFT LATERAL ELBOW
LOCATION DETAILED: RIGHT ANTERIOR DISTAL THIGH
LOCATION DETAILED: RIGHT PROXIMAL DORSAL FOREARM
LOCATION DETAILED: RIGHT CENTRAL TEMPLE
LOCATION DETAILED: RIGHT CENTRAL EYEBROW

## 2021-09-09 ASSESSMENT — LOCATION ZONE DERM
LOCATION ZONE: LIP
LOCATION ZONE: ARM
LOCATION ZONE: LEG
LOCATION ZONE: FACE
LOCATION ZONE: TRUNK
LOCATION ZONE: FEET

## 2021-09-09 NOTE — PROCEDURE: LIQUID NITROGEN
Show Applicator Variable?: Yes
Render Post-Care Instructions In Note?: no
Detail Level: Detailed
Consent: The patient's consent was obtained including but not limited to risks of crusting, scabbing, blistering, scarring, darker or lighter pigmentary change, recurrence, incomplete removal and infection.
Post-Care Instructions: I reviewed with the patient in detail post-care instructions. Patient is to wear sunprotection, and avoid picking at any of the treated lesions. Pt may apply Vaseline to crusted or scabbing areas.
Duration Of Freeze Thaw-Cycle (Seconds): 0

## 2021-09-09 NOTE — PROCEDURE: COUNSELING
Detail Level: Zone
Patient Specific Counseling (Will Not Stick From Patient To Patient): Recommended Eucerin Rough and bumpy and to soak feet in warm water.
Patient Specific Counseling (Will Not Stick From Patient To Patient): Recommended Jolynn.
Detail Level: Detailed

## 2021-09-09 NOTE — PROCEDURE: BIOPSY BY SHAVE METHOD
Detail Level: Detailed
Depth Of Biopsy: dermis
Was A Bandage Applied: Yes
Size Of Lesion In Cm: 1.7
X Size Of Lesion In Cm: 0
Biopsy Type: H and E
Biopsy Method: Personna blade
Anesthesia Type: 1% lidocaine with epinephrine
Anesthesia Volume In Cc: 1
Hemostasis: Electrocautery
Wound Care: Vaseline
Dressing: Band-Aid
Destruction After The Procedure: No
Type Of Destruction Used: Electrodesiccation
Curettage Text: The wound bed was treated with curettage after the biopsy was performed.
Cryotherapy Text: The wound bed was treated with cryotherapy after the biopsy was performed.
Electrodesiccation Text: The wound bed was treated with electrodesiccation after the biopsy was performed.
Electrodesiccation And Curettage Text: The wound bed was treated with electrodesiccation and curettage after the biopsy was performed.
Silver Nitrate Text: The wound bed was treated with silver nitrate after the biopsy was performed.
Lab: [id number]
Lab Facility: 
Consent: Written consent was obtained and risks were reviewed including but not limited to scarring, infection, bleeding, scabbing, incomplete removal, nerve damage and allergy to anesthesia.
Post-Care Instructions: I reviewed with the patient in detail post-care instructions. Patient is to keep the biopsy site dry overnight, and then apply bacitracin/petroleum  twice daily until healed. Patient may apply hydrogen peroxide soaks to remove any crusting.
Notification Instructions: Patient will be notified of biopsy results. However, patient instructed to call the office if not contacted within 2 weeks.
Billing Type: Third-Party Bill
Information: Selecting Yes will display possible errors in your note based on the variables you have selected. This validation is only offered as a suggestion for you. PLEASE NOTE THAT THE VALIDATION TEXT WILL BE REMOVED WHEN YOU FINALIZE YOUR NOTE. IF YOU WANT TO FAX A PRELIMINARY NOTE YOU WILL NEED TO TOGGLE THIS TO 'NO' IF YOU DO NOT WANT IT IN YOUR FAXED NOTE.

## 2021-10-07 ENCOUNTER — APPOINTMENT (RX ONLY)
Dept: URBAN - METROPOLITAN AREA CLINIC 4 | Facility: CLINIC | Age: 71
Setting detail: DERMATOLOGY
End: 2021-10-07

## 2021-10-07 DIAGNOSIS — L57.0 ACTINIC KERATOSIS: ICD-10-CM

## 2021-10-07 DIAGNOSIS — B00.1 HERPESVIRAL VESICULAR DERMATITIS: ICD-10-CM | Status: RESOLVED

## 2021-10-07 DIAGNOSIS — L82.1 OTHER SEBORRHEIC KERATOSIS: ICD-10-CM

## 2021-10-07 PROCEDURE — ? COUNSELING

## 2021-10-07 PROCEDURE — ? LIQUID NITROGEN

## 2021-10-07 PROCEDURE — 17000 DESTRUCT PREMALG LESION: CPT

## 2021-10-07 PROCEDURE — ? DIAGNOSIS COMMENT

## 2021-10-07 PROCEDURE — 99212 OFFICE O/P EST SF 10 MIN: CPT | Mod: 25

## 2021-10-07 ASSESSMENT — LOCATION SIMPLE DESCRIPTION DERM
LOCATION SIMPLE: RIGHT TEMPLE
LOCATION SIMPLE: LEFT ZYGOMA
LOCATION SIMPLE: RIGHT LIP
LOCATION SIMPLE: SUPERIOR FOREHEAD

## 2021-10-07 ASSESSMENT — LOCATION DETAILED DESCRIPTION DERM
LOCATION DETAILED: RIGHT CENTRAL TEMPLE
LOCATION DETAILED: SUPERIOR MID FOREHEAD
LOCATION DETAILED: RIGHT INFERIOR VERMILION LIP
LOCATION DETAILED: LEFT CENTRAL ZYGOMA

## 2021-10-07 ASSESSMENT — LOCATION ZONE DERM
LOCATION ZONE: LIP
LOCATION ZONE: FACE

## 2021-10-07 NOTE — PROCEDURE: LIQUID NITROGEN
Detail Level: Detailed
Consent: The patient's consent was obtained including but not limited to risks of crusting, scabbing, blistering, scarring, darker or lighter pigmentary change, recurrence, incomplete removal and infection.
Show Aperture Variable?: Yes
Duration Of Freeze Thaw-Cycle (Seconds): 0
Render Note In Bullet Format When Appropriate: No
Post-Care Instructions: I reviewed with the patient in detail post-care instructions. Patient is to wear sunprotection, and avoid picking at any of the treated lesions. Pt may apply Vaseline to crusted or scabbing areas.
Medical Necessity Information: It is in your best interest to select a reason for this procedure from the list below. All of these items fulfill various CMS LCD requirements except the new and changing color options.
Medical Necessity Clause: This procedure was medically necessary because the lesions that were treated were:  If lesion does not resolve, bx is needed.

## 2021-11-20 ENCOUNTER — HOSPITAL ENCOUNTER (OUTPATIENT)
Dept: LAB | Facility: MEDICAL CENTER | Age: 71
End: 2021-11-20
Attending: FAMILY MEDICINE
Payer: COMMERCIAL

## 2021-11-20 LAB
ALBUMIN SERPL BCP-MCNC: 4.6 G/DL (ref 3.2–4.9)
ALBUMIN/GLOB SERPL: 2 G/DL
ALP SERPL-CCNC: 58 U/L (ref 30–99)
ALT SERPL-CCNC: 13 U/L (ref 2–50)
ANION GAP SERPL CALC-SCNC: 10 MMOL/L (ref 7–16)
AST SERPL-CCNC: 10 U/L (ref 12–45)
BASOPHILS # BLD AUTO: 0.3 % (ref 0–1.8)
BASOPHILS # BLD: 0.02 K/UL (ref 0–0.12)
BILIRUB SERPL-MCNC: 0.2 MG/DL (ref 0.1–1.5)
BUN SERPL-MCNC: 19 MG/DL (ref 8–22)
CALCIUM SERPL-MCNC: 8.9 MG/DL (ref 8.5–10.5)
CHLORIDE SERPL-SCNC: 107 MMOL/L (ref 96–112)
CHOLEST SERPL-MCNC: 182 MG/DL (ref 100–199)
CO2 SERPL-SCNC: 23 MMOL/L (ref 20–33)
CREAT SERPL-MCNC: 0.65 MG/DL (ref 0.5–1.4)
EOSINOPHIL # BLD AUTO: 0.17 K/UL (ref 0–0.51)
EOSINOPHIL NFR BLD: 2.9 % (ref 0–6.9)
ERYTHROCYTE [DISTWIDTH] IN BLOOD BY AUTOMATED COUNT: 44.3 FL (ref 35.9–50)
EST. AVERAGE GLUCOSE BLD GHB EST-MCNC: 108 MG/DL
FASTING STATUS PATIENT QL REPORTED: NORMAL
GLOBULIN SER CALC-MCNC: 2.3 G/DL (ref 1.9–3.5)
GLUCOSE SERPL-MCNC: 102 MG/DL (ref 65–99)
HBA1C MFR BLD: 5.4 % (ref 4–5.6)
HCT VFR BLD AUTO: 39.4 % (ref 37–47)
HDLC SERPL-MCNC: 40 MG/DL
HGB BLD-MCNC: 13 G/DL (ref 12–16)
IMM GRANULOCYTES # BLD AUTO: 0.02 K/UL (ref 0–0.11)
IMM GRANULOCYTES NFR BLD AUTO: 0.3 % (ref 0–0.9)
LDLC SERPL CALC-MCNC: 103 MG/DL
LYMPHOCYTES # BLD AUTO: 1.71 K/UL (ref 1–4.8)
LYMPHOCYTES NFR BLD: 28.9 % (ref 22–41)
MCH RBC QN AUTO: 31.6 PG (ref 27–33)
MCHC RBC AUTO-ENTMCNC: 33 G/DL (ref 33.6–35)
MCV RBC AUTO: 95.9 FL (ref 81.4–97.8)
MONOCYTES # BLD AUTO: 0.35 K/UL (ref 0–0.85)
MONOCYTES NFR BLD AUTO: 5.9 % (ref 0–13.4)
NEUTROPHILS # BLD AUTO: 3.65 K/UL (ref 2–7.15)
NEUTROPHILS NFR BLD: 61.7 % (ref 44–72)
NRBC # BLD AUTO: 0 K/UL
NRBC BLD-RTO: 0 /100 WBC
PLATELET # BLD AUTO: 261 K/UL (ref 164–446)
PMV BLD AUTO: 9.4 FL (ref 9–12.9)
POTASSIUM SERPL-SCNC: 4.4 MMOL/L (ref 3.6–5.5)
PROT SERPL-MCNC: 6.9 G/DL (ref 6–8.2)
RBC # BLD AUTO: 4.11 M/UL (ref 4.2–5.4)
SODIUM SERPL-SCNC: 140 MMOL/L (ref 135–145)
TRIGL SERPL-MCNC: 193 MG/DL (ref 0–149)
WBC # BLD AUTO: 5.9 K/UL (ref 4.8–10.8)

## 2021-11-20 PROCEDURE — 83036 HEMOGLOBIN GLYCOSYLATED A1C: CPT

## 2021-11-20 PROCEDURE — 80053 COMPREHEN METABOLIC PANEL: CPT

## 2021-11-20 PROCEDURE — 80061 LIPID PANEL: CPT

## 2021-11-20 PROCEDURE — 85025 COMPLETE CBC W/AUTO DIFF WBC: CPT

## 2021-11-20 PROCEDURE — 36415 COLL VENOUS BLD VENIPUNCTURE: CPT

## 2021-11-29 ENCOUNTER — HOSPITAL ENCOUNTER (OUTPATIENT)
Facility: MEDICAL CENTER | Age: 71
End: 2021-11-29
Attending: ORTHOPAEDIC SURGERY
Payer: COMMERCIAL

## 2021-11-29 PROCEDURE — 87641 MR-STAPH DNA AMP PROBE: CPT

## 2021-11-29 PROCEDURE — 87640 STAPH A DNA AMP PROBE: CPT

## 2021-11-30 LAB
AMBIGUOUS DTTM AMBI4: NORMAL
SCCMEC + MECA PNL NOSE NAA+PROBE: NEGATIVE
SCCMEC + MECA PNL NOSE NAA+PROBE: NEGATIVE
SIGNIFICANT IND 70042: NORMAL
SITE SITE: NORMAL
SOURCE SOURCE: NORMAL

## 2022-03-09 ENCOUNTER — HOSPITAL ENCOUNTER (OUTPATIENT)
Dept: RADIOLOGY | Facility: MEDICAL CENTER | Age: 72
End: 2022-03-09
Attending: FAMILY MEDICINE
Payer: COMMERCIAL

## 2022-03-09 DIAGNOSIS — M25.532 LEFT WRIST PAIN: ICD-10-CM

## 2022-03-09 PROCEDURE — 73110 X-RAY EXAM OF WRIST: CPT | Mod: LT

## 2022-08-12 ENCOUNTER — TELEPHONE (OUTPATIENT)
Dept: HEALTH INFORMATION MANAGEMENT | Facility: OTHER | Age: 72
End: 2022-08-12
Payer: MEDICARE

## 2022-08-12 ENCOUNTER — TELEPHONE (OUTPATIENT)
Dept: SCHEDULING | Facility: IMAGING CENTER | Age: 72
End: 2022-08-12

## 2022-08-16 ENCOUNTER — OFFICE VISIT (OUTPATIENT)
Dept: MEDICAL GROUP | Facility: MEDICAL CENTER | Age: 72
End: 2022-08-16
Payer: COMMERCIAL

## 2022-08-16 VITALS
BODY MASS INDEX: 28.25 KG/M2 | HEART RATE: 69 BPM | WEIGHT: 186.4 LBS | RESPIRATION RATE: 16 BRPM | TEMPERATURE: 98.3 F | SYSTOLIC BLOOD PRESSURE: 120 MMHG | OXYGEN SATURATION: 97 % | HEIGHT: 68 IN | DIASTOLIC BLOOD PRESSURE: 82 MMHG

## 2022-08-16 DIAGNOSIS — H93.13 TINNITUS OF BOTH EARS: ICD-10-CM

## 2022-08-16 DIAGNOSIS — G43.109 OCULAR MIGRAINE: ICD-10-CM

## 2022-08-16 DIAGNOSIS — N95.1 MENOPAUSAL SYMPTOMS: ICD-10-CM

## 2022-08-16 DIAGNOSIS — Z00.00 ENCOUNTER FOR MEDICAL EXAMINATION TO ESTABLISH CARE: ICD-10-CM

## 2022-08-16 DIAGNOSIS — K57.90 DIVERTICULOSIS: ICD-10-CM

## 2022-08-16 DIAGNOSIS — E78.2 MIXED HYPERLIPIDEMIA: ICD-10-CM

## 2022-08-16 DIAGNOSIS — F33.42 RECURRENT MAJOR DEPRESSIVE DISORDER, IN FULL REMISSION (HCC): ICD-10-CM

## 2022-08-16 PROCEDURE — 99204 OFFICE O/P NEW MOD 45 MIN: CPT | Performed by: PHYSICIAN ASSISTANT

## 2022-08-16 RX ORDER — ESTRADIOL 0.5 MG/1
0.5 TABLET ORAL DAILY
Qty: 30 TABLET | Refills: 2 | Status: SHIPPED | OUTPATIENT
Start: 2022-08-16

## 2022-08-16 RX ORDER — SERTRALINE HYDROCHLORIDE 100 MG/1
100 TABLET, FILM COATED ORAL DAILY
Qty: 90 TABLET | Refills: 3 | Status: SHIPPED | OUTPATIENT
Start: 2022-08-16

## 2022-08-16 ASSESSMENT — FIBROSIS 4 INDEX: FIB4 SCORE: 0.77

## 2022-08-16 NOTE — PROGRESS NOTES
Subjectivs:     Chief Complaint   Patient presents with    Establish Care     Chrissy Willoughby is a 72 y.o. female here today to follow to Discuss:    HLD (hyperlipidemia)  Chronic: stable on zetia 10mg    Menopausal symptoms  Chronic:  sac patient has gynecologist Dr. Danielle Nicholson.  She was put on estradiol 0.5 mg for menopausal symptoms. Hysterectomy in 2009    Diverticulosis  Taking zenpack to help IBS and for pancreatic insufficiency    Encounter for medical examination to establish care  Pleasant 72-year-old female who is here to establish care has multiple medical conditions.  Reports a history of tinnitus, ocular migraines that responds well to magnesium was had multiple surgeries including left rotator repair and December 2021 and paraesophageal hernia repair in 2018     Current medicines (including changes today)  Current Outpatient Medications   Medication Sig Dispense Refill    sertraline (ZOLOFT) 100 MG Tab Take 1 Tablet by mouth every day. 90 Tablet 3    estradiol (ESTRACE) 0.5 MG tablet Take 1 Tablet by mouth every day. 30 Tablet 2    ezetimibe (ZETIA) 10 MG Tab TAKE 1 TABLET BY MOUTH ONCE DAILY FOR 90 DAYS      Influenza Virus Vacc Split PF 0.5 ML Suspension Prefilled Syringe Fluad 2018-19 65yr up(PF)45 mcg(15 mcgx3)/0.5 mL intramuscular syringe   ADM 0.5ML IM UTD      omeprazole (PRILOSEC) 20 MG delayed-release capsule Take 20 mg by mouth every day.      vitamin D (CHOLECALCIFEROL) 1000 UNIT Tab Take 1,000 Units by mouth every day.       No current facility-administered medications for this visit.     She  has a past medical history of Anesthesia, Bronchitis (2013), Cataract, Heart burn, Hiatus hernia syndrome, High cholesterol, Indigestion, Other specified disorder of intestines, Pain, PONV (postoperative nausea and vomiting), Psychiatric problem, and Renal disorder.    ROS  As per listed in the HPI, otherwise negative     Objective:     /82   Pulse 69   Temp 36.8 °C (98.3 °F) (Temporal)   " Resp 16   Ht 1.727 m (5' 8\")   Wt 84.6 kg (186 lb 6.4 oz)   SpO2 97%  Body mass index is 28.34 kg/m².     Physical Exam:  General: Patient appears well-nourished, well-hydrated, nontoxic  HEENT, normocephalic atraumatic, PERRLA, extraocular movements intact, nares are patent and clear  Neck: No visible masses, thyromegaly or abnormalities noted  Cardiovascular.  Sitting comfortably without visible signs of edema  Lungs: No cyanosis noted, nondyspneic  Skin: Well perfused without evidence of rash or lesions  Neurological: Cranial nerves II through XII intact, normal gait  Musculoskeletal: Normal range of motion, normal strength and no deficit noted       Assessment and Plan:   The following treatment plan was discussed and signs and symptoms for which to return were discussed with patient.  Patient verbalized understanding.    1. Recurrent major depressive disorder, in full remission (HCC)  Chronic and stable  - sertraline (ZOLOFT) 100 MG Tab; Take 1 Tablet by mouth every day.  Dispense: 90 Tablet; Refill: 3    2. Menopausal symptoms  Chronic and stable  Patient sees Dr. Danielle Nicholson, gynecology, is status post hysterectomy  She takes estradiol 0.5 mg daily     3. Mixed hyperlipidemia  Chronic and stable  Has taken many statins and they were intolerable  Continue Zetia 10 mg daily     4. IBS  Chronic: unstable  Continue zenpep (amylase/lipase)    Ocular migraines  Chronic and stable  Continue migraine supplementation    6.  Tinnitus of both ears  Patient brought this up as she was walking out.  We will discuss further at next visit.  May benefit from ENT referral.  Did discuss importance of avoidance of salt, avoidance of NSAIDs and may need to get hearing checked    Followup: Return in about 3 months (around 11/16/2022).  For physical exam and to orders screening labs.  Patient defers mammogram         Please note that this dictation was created using voice recognition software. I have made every reasonable " attempt to correct obvious errors, but I expect that there are errors of grammar and possibly content that I did not discover before finalizing the note.

## 2022-08-16 NOTE — ASSESSMENT & PLAN NOTE
Pleasant 72-year-old female who is here to establish care has multiple medical conditions.  Reports a history of tinnitus, ocular migraines that responds well to magnesium was had multiple surgeries including left rotator repair and December 2021 and paraesophageal hernia repair in 2018

## 2022-08-16 NOTE — ASSESSMENT & PLAN NOTE
Chronic:  sac patient has gynecologist Dr. Danielle Nicholson.  She was put on estradiol 0.5 mg for menopausal symptoms. Hysterectomy in 2009

## 2022-09-16 ENCOUNTER — APPOINTMENT (RX ONLY)
Dept: URBAN - METROPOLITAN AREA CLINIC 4 | Facility: CLINIC | Age: 72
Setting detail: DERMATOLOGY
End: 2022-09-16

## 2022-09-16 DIAGNOSIS — H02.72 MADAROSIS OF EYELID AND PERIOCULAR AREA: ICD-10-CM

## 2022-09-16 DIAGNOSIS — L57.0 ACTINIC KERATOSIS: ICD-10-CM

## 2022-09-16 DIAGNOSIS — L82.1 OTHER SEBORRHEIC KERATOSIS: ICD-10-CM

## 2022-09-16 PROBLEM — H02.729 MADAROSIS OF UNSPECIFIED EYE, UNSPECIFIED EYELID AND PERIOCULAR AREA: Status: ACTIVE | Noted: 2022-09-16

## 2022-09-16 PROCEDURE — ? LIQUID NITROGEN

## 2022-09-16 PROCEDURE — 99212 OFFICE O/P EST SF 10 MIN: CPT | Mod: 25

## 2022-09-16 PROCEDURE — 17003 DESTRUCT PREMALG LES 2-14: CPT

## 2022-09-16 PROCEDURE — ? PRESCRIPTION

## 2022-09-16 PROCEDURE — 17000 DESTRUCT PREMALG LESION: CPT

## 2022-09-16 PROCEDURE — ? COUNSELING

## 2022-09-16 RX ORDER — BIMATOPROST 0.3 MG/ML
SOLUTION/ DROPS OPHTHALMIC
Qty: 3 | Refills: 12 | Status: ERX | COMMUNITY
Start: 2022-09-16

## 2022-09-16 RX ADMIN — BIMATOPROST 1: 0.3 SOLUTION/ DROPS OPHTHALMIC at 00:00

## 2022-09-16 ASSESSMENT — LOCATION SIMPLE DESCRIPTION DERM
LOCATION SIMPLE: RIGHT EYEBROW
LOCATION SIMPLE: CHEST
LOCATION SIMPLE: LEFT CHEEK
LOCATION SIMPLE: NOSE
LOCATION SIMPLE: LEFT ZYGOMA
LOCATION SIMPLE: LEFT EYEBROW

## 2022-09-16 ASSESSMENT — LOCATION DETAILED DESCRIPTION DERM
LOCATION DETAILED: LEFT MEDIAL ZYGOMA
LOCATION DETAILED: UPPER STERNUM
LOCATION DETAILED: LEFT MEDIAL SUPERIOR CHEST
LOCATION DETAILED: RIGHT CENTRAL EYEBROW
LOCATION DETAILED: NASAL DORSUM
LOCATION DETAILED: LEFT LATERAL EYEBROW
LOCATION DETAILED: LEFT SUPERIOR CENTRAL MALAR CHEEK

## 2022-09-16 ASSESSMENT — LOCATION ZONE DERM
LOCATION ZONE: TRUNK
LOCATION ZONE: FACE
LOCATION ZONE: NOSE

## 2022-09-16 NOTE — PROCEDURE: LIQUID NITROGEN
Post-Care Instructions: I reviewed with the patient in detail post-care instructions. Patient is to wear sunprotection, and avoid picking at any of the treated lesions. Pt may apply Vaseline to crusted or scabbing areas.
Duration Of Freeze Thaw-Cycle (Seconds): 0
Render Note In Bullet Format When Appropriate: No
Consent: The patient's consent was obtained including but not limited to risks of crusting, scabbing, blistering, scarring, darker or lighter pigmentary change, recurrence, incomplete removal and infection.
Show Aperture Variable?: Yes
Detail Level: Detailed
Spray Paint Text: The liquid nitrogen was applied to the skin utilizing a spray paint frosting technique.
Medical Necessity Information: It is in your best interest to select a reason for this procedure from the list below. All of these items fulfill various CMS LCD requirements except the new and changing color options.
Medical Necessity Clause: This procedure was medically necessary because the lesions that were treated were:  If lesion does not resolve, bx is needed.

## 2022-09-16 NOTE — PROCEDURE: MIPS QUALITY
Quality 431: Preventive Care And Screening: Unhealthy Alcohol Use - Screening: Patient not identified as an unhealthy alcohol user when screened for unhealthy alcohol use using a systematic screening method
Quality 110: Preventive Care And Screening: Influenza Immunization: Influenza Immunization Administered during Influenza season
Quality 111:Pneumonia Vaccination Status For Older Adults: Pneumococcal vaccine (PPSV23) administered on or after patient’s 60th birthday and before the end of the measurement period
Quality 226: Preventive Care And Screening: Tobacco Use: Screening And Cessation Intervention: Patient screened for tobacco use and is an ex/non-smoker
Quality 130: Documentation Of Current Medications In The Medical Record: Current Medications Documented
Detail Level: Detailed

## 2022-11-03 ENCOUNTER — DOCUMENTATION (OUTPATIENT)
Dept: HEALTH INFORMATION MANAGEMENT | Facility: OTHER | Age: 72
End: 2022-11-03
Payer: MEDICARE

## 2022-11-03 ENCOUNTER — PATIENT MESSAGE (OUTPATIENT)
Dept: HEALTH INFORMATION MANAGEMENT | Facility: OTHER | Age: 72
End: 2022-11-03

## 2022-11-11 ENCOUNTER — PATIENT MESSAGE (OUTPATIENT)
Dept: HEALTH INFORMATION MANAGEMENT | Facility: OTHER | Age: 72
End: 2022-11-11

## 2022-11-16 SDOH — ECONOMIC STABILITY: INCOME INSECURITY: IN THE LAST 12 MONTHS, WAS THERE A TIME WHEN YOU WERE NOT ABLE TO PAY THE MORTGAGE OR RENT ON TIME?: NO

## 2022-11-16 SDOH — ECONOMIC STABILITY: FOOD INSECURITY: WITHIN THE PAST 12 MONTHS, THE FOOD YOU BOUGHT JUST DIDN'T LAST AND YOU DIDN'T HAVE MONEY TO GET MORE.: NEVER TRUE

## 2022-11-16 SDOH — ECONOMIC STABILITY: TRANSPORTATION INSECURITY
IN THE PAST 12 MONTHS, HAS THE LACK OF TRANSPORTATION KEPT YOU FROM MEDICAL APPOINTMENTS OR FROM GETTING MEDICATIONS?: NO

## 2022-11-16 SDOH — ECONOMIC STABILITY: HOUSING INSECURITY: IN THE LAST 12 MONTHS, HOW MANY PLACES HAVE YOU LIVED?: 1

## 2022-11-16 SDOH — ECONOMIC STABILITY: INCOME INSECURITY: HOW HARD IS IT FOR YOU TO PAY FOR THE VERY BASICS LIKE FOOD, HOUSING, MEDICAL CARE, AND HEATING?: NOT HARD AT ALL

## 2022-11-16 SDOH — HEALTH STABILITY: PHYSICAL HEALTH: ON AVERAGE, HOW MANY DAYS PER WEEK DO YOU ENGAGE IN MODERATE TO STRENUOUS EXERCISE (LIKE A BRISK WALK)?: 2 DAYS

## 2022-11-16 SDOH — ECONOMIC STABILITY: HOUSING INSECURITY
IN THE LAST 12 MONTHS, WAS THERE A TIME WHEN YOU DID NOT HAVE A STEADY PLACE TO SLEEP OR SLEPT IN A SHELTER (INCLUDING NOW)?: NO

## 2022-11-16 SDOH — HEALTH STABILITY: MENTAL HEALTH
STRESS IS WHEN SOMEONE FEELS TENSE, NERVOUS, ANXIOUS, OR CAN'T SLEEP AT NIGHT BECAUSE THEIR MIND IS TROUBLED. HOW STRESSED ARE YOU?: ONLY A LITTLE

## 2022-11-16 SDOH — ECONOMIC STABILITY: TRANSPORTATION INSECURITY
IN THE PAST 12 MONTHS, HAS LACK OF TRANSPORTATION KEPT YOU FROM MEETINGS, WORK, OR FROM GETTING THINGS NEEDED FOR DAILY LIVING?: NO

## 2022-11-16 SDOH — ECONOMIC STABILITY: FOOD INSECURITY: WITHIN THE PAST 12 MONTHS, YOU WORRIED THAT YOUR FOOD WOULD RUN OUT BEFORE YOU GOT MONEY TO BUY MORE.: NEVER TRUE

## 2022-11-16 SDOH — HEALTH STABILITY: PHYSICAL HEALTH: ON AVERAGE, HOW MANY MINUTES DO YOU ENGAGE IN EXERCISE AT THIS LEVEL?: 30 MIN

## 2022-11-16 SDOH — ECONOMIC STABILITY: TRANSPORTATION INSECURITY
IN THE PAST 12 MONTHS, HAS LACK OF RELIABLE TRANSPORTATION KEPT YOU FROM MEDICAL APPOINTMENTS, MEETINGS, WORK OR FROM GETTING THINGS NEEDED FOR DAILY LIVING?: NO

## 2022-11-16 ASSESSMENT — SOCIAL DETERMINANTS OF HEALTH (SDOH)
HOW OFTEN DO YOU ATTENT MEETINGS OF THE CLUB OR ORGANIZATION YOU BELONG TO?: NEVER
WITHIN THE PAST 12 MONTHS, YOU WORRIED THAT YOUR FOOD WOULD RUN OUT BEFORE YOU GOT THE MONEY TO BUY MORE: NEVER TRUE
HOW OFTEN DO YOU HAVE A DRINK CONTAINING ALCOHOL: MONTHLY OR LESS
HOW OFTEN DO YOU GET TOGETHER WITH FRIENDS OR RELATIVES?: ONCE A WEEK
HOW OFTEN DO YOU ATTEND CHURCH OR RELIGIOUS SERVICES?: NEVER
DO YOU BELONG TO ANY CLUBS OR ORGANIZATIONS SUCH AS CHURCH GROUPS UNIONS, FRATERNAL OR ATHLETIC GROUPS, OR SCHOOL GROUPS?: NO
IN A TYPICAL WEEK, HOW MANY TIMES DO YOU TALK ON THE PHONE WITH FAMILY, FRIENDS, OR NEIGHBORS?: THREE TIMES A WEEK
DO YOU BELONG TO ANY CLUBS OR ORGANIZATIONS SUCH AS CHURCH GROUPS UNIONS, FRATERNAL OR ATHLETIC GROUPS, OR SCHOOL GROUPS?: NO
HOW MANY DRINKS CONTAINING ALCOHOL DO YOU HAVE ON A TYPICAL DAY WHEN YOU ARE DRINKING: 1 OR 2
HOW OFTEN DO YOU ATTEND CHURCH OR RELIGIOUS SERVICES?: NEVER
HOW OFTEN DO YOU ATTENT MEETINGS OF THE CLUB OR ORGANIZATION YOU BELONG TO?: NEVER
IN A TYPICAL WEEK, HOW MANY TIMES DO YOU TALK ON THE PHONE WITH FAMILY, FRIENDS, OR NEIGHBORS?: THREE TIMES A WEEK
HOW OFTEN DO YOU GET TOGETHER WITH FRIENDS OR RELATIVES?: ONCE A WEEK
HOW OFTEN DO YOU HAVE SIX OR MORE DRINKS ON ONE OCCASION: NEVER
HOW HARD IS IT FOR YOU TO PAY FOR THE VERY BASICS LIKE FOOD, HOUSING, MEDICAL CARE, AND HEATING?: NOT HARD AT ALL

## 2022-11-16 ASSESSMENT — LIFESTYLE VARIABLES
HOW OFTEN DO YOU HAVE SIX OR MORE DRINKS ON ONE OCCASION: NEVER
HOW OFTEN DO YOU HAVE A DRINK CONTAINING ALCOHOL: MONTHLY OR LESS
HOW MANY STANDARD DRINKS CONTAINING ALCOHOL DO YOU HAVE ON A TYPICAL DAY: 1 OR 2
SKIP TO QUESTIONS 9-10: 1
AUDIT-C TOTAL SCORE: 1

## 2022-11-17 ENCOUNTER — OFFICE VISIT (OUTPATIENT)
Dept: MEDICAL GROUP | Facility: MEDICAL CENTER | Age: 72
End: 2022-11-17
Payer: COMMERCIAL

## 2022-11-17 VITALS
BODY MASS INDEX: 28.79 KG/M2 | HEIGHT: 68 IN | HEART RATE: 82 BPM | RESPIRATION RATE: 16 BRPM | TEMPERATURE: 97.5 F | SYSTOLIC BLOOD PRESSURE: 124 MMHG | OXYGEN SATURATION: 96 % | DIASTOLIC BLOOD PRESSURE: 76 MMHG | WEIGHT: 190 LBS

## 2022-11-17 DIAGNOSIS — Z11.59 ENCOUNTER FOR HEPATITIS C SCREENING TEST FOR LOW RISK PATIENT: ICD-10-CM

## 2022-11-17 DIAGNOSIS — E78.2 MIXED HYPERLIPIDEMIA: ICD-10-CM

## 2022-11-17 DIAGNOSIS — Z00.00 PREVENTATIVE HEALTH CARE: ICD-10-CM

## 2022-11-17 PROCEDURE — 99397 PER PM REEVAL EST PAT 65+ YR: CPT | Performed by: PHYSICIAN ASSISTANT

## 2022-11-17 RX ORDER — SERTRALINE HYDROCHLORIDE 100 MG/1
TABLET, FILM COATED ORAL
COMMUNITY
End: 2022-11-17

## 2022-11-17 RX ORDER — ZOSTER VACCINE RECOMBINANT, ADJUVANTED 50 MCG/0.5
KIT INTRAMUSCULAR
COMMUNITY
End: 2023-06-07

## 2022-11-17 RX ORDER — BIMATOPROST 3 UG/ML
SOLUTION TOPICAL
COMMUNITY
Start: 2022-09-18 | End: 2023-06-05

## 2022-11-17 RX ORDER — EZETIMIBE 10 MG/1
10 TABLET ORAL DAILY
Qty: 90 TABLET | Refills: 3 | Status: SHIPPED | OUTPATIENT
Start: 2022-11-17 | End: 2023-11-28

## 2022-11-17 RX ORDER — PANCRELIPASE LIPASE, PANCRELIPASE PROTEASE, PANCRELIPASE AMYLASE 40000; 126000; 168000 [USP'U]/1; [USP'U]/1; [USP'U]/1
CAPSULE, DELAYED RELEASE ORAL DAILY
COMMUNITY
Start: 2022-10-21

## 2022-11-17 RX ORDER — TOBRAMYCIN AND DEXAMETHASONE 3; 1 MG/ML; MG/ML
SUSPENSION/ DROPS OPHTHALMIC
COMMUNITY
End: 2022-11-17

## 2022-11-17 RX ORDER — TRAMADOL HYDROCHLORIDE 50 MG/1
TABLET ORAL
COMMUNITY
End: 2022-11-17

## 2022-11-17 ASSESSMENT — PATIENT HEALTH QUESTIONNAIRE - PHQ9
4. FEELING TIRED OR HAVING LITTLE ENERGY: NOT AT ALL
9. THOUGHTS THAT YOU WOULD BE BETTER OFF DEAD, OR OF HURTING YOURSELF: NOT AT ALL
CLINICAL INTERPRETATION OF PHQ2 SCORE: 0
2. FEELING DOWN, DEPRESSED, IRRITABLE, OR HOPELESS: NOT AT ALL
7. TROUBLE CONCENTRATING ON THINGS, SUCH AS READING THE NEWSPAPER OR WATCHING TELEVISION: NOT AT ALL
5. POOR APPETITE OR OVEREATING: 0 - NOT AT ALL
1. LITTLE INTEREST OR PLEASURE IN DOING THINGS: NOT AT ALL
5. POOR APPETITE OR OVEREATING: NOT AT ALL
3. TROUBLE FALLING OR STAYING ASLEEP OR SLEEPING TOO MUCH: NOT AT ALL
8. MOVING OR SPEAKING SO SLOWLY THAT OTHER PEOPLE COULD HAVE NOTICED. OR THE OPPOSITE, BEING SO FIGETY OR RESTLESS THAT YOU HAVE BEEN MOVING AROUND A LOT MORE THAN USUAL: NOT AT ALL
6. FEELING BAD ABOUT YOURSELF - OR THAT YOU ARE A FAILURE OR HAVE LET YOURSELF OR YOUR FAMILY DOWN: NOT AL ALL
SUM OF ALL RESPONSES TO PHQ9 QUESTIONS 1 AND 2: 0
SUM OF ALL RESPONSES TO PHQ QUESTIONS 1-9: 0

## 2022-11-17 ASSESSMENT — FIBROSIS 4 INDEX: FIB4 SCORE: 0.77

## 2022-11-17 NOTE — LETTER
[facility]  Kathy Crowley P.A.-C.  [address]  [place] [address]  Fax: [phone]   Authorization for Release/Disclosure of   Protected Health Information   Name: BA RATLIFF : 1950 SSN: [id number]   Address: [address] [place] [address]  [place] [address] Phone:    [phone] (home)    I authorize the entity listed below to release/disclose the PHI below to:   [facility]/Kathy Crowley P.A.-C. and Kathy Crowley P.A.-C.   Provider or Entity Name:     Address   City, State, Zip   Phone:      Fax:     Reason for request: continuity of care   Information to be released:    [  ] LAST COLONOSCOPY,  including any PATH REPORT and follow-up  [  ] LAST FIT/COLOGUARD RESULT [  ] LAST DEXA  [  ] LAST MAMMOGRAM  [  ] LAST PAP  [  ] LAST LABS [  ] RETINA EXAM REPORT  [  ] IMMUNIZATION RECORDS  [  ] Release all info      [  ] Check here and initial the line next to each item to release ALL health information INCLUDING  _____ Care and treatment for drug and / or alcohol abuse  _____ HIV testing, infection status, or AIDS  _____ Genetic Testing    DATES OF SERVICE OR TIME PERIOD TO BE DISCLOSED: _____________  I understand and acknowledge that:  * This Authorization may be revoked at any time by you in writing, except if your health information has already been used or disclosed.  * Your health information that will be used or disclosed as a result of you signing this authorization could be re-disclosed by the recipient. If this occurs, your re-disclosed health information may no longer be protected by State or Federal laws.  * You may refuse to sign this Authorization. Your refusal will not affect your ability to obtain treatment.  * This Authorization becomes effective upon signing and will  on (date) __________.      If no date is indicated, this Authorization will  one (1) year from the signature date.    Name: Ba Ratliff    Signature:   Date:     2022       PLEASE  FAX REQUESTED RECORDS BACK TO: [phone]

## 2022-11-17 NOTE — PROGRESS NOTES
Subjective:   Chrissy Willoughby is a 72 y.o. female here today for     HPI:Patient is here to discuss:    Physical exam  No new complaints. Needs nee labs      Current medicines (including changes today)  Current Outpatient Medications   Medication Sig Dispense Refill    ZENPEP 39851-044869 units Cap DR Particles TAKE 2 CAPSULES BY MOUTH BEFORE EVERY MEAL AND 1 CAPSULE BEFORE EVERY SNACK      Zoster Vac Recomb Adjuvanted (SHINGRIX) 50 MCG/0.5ML Recon Susp Shingrix (PF) 50 mcg/0.5 mL intramuscular suspension, kit   PHARMACIST ADMINISTERED IMMUNIZATION ADMINISTERED AT TIME OF DISPENSING      ezetimibe (ZETIA) 10 MG Tab Take 1 Tablet by mouth every day. FOR 90 DAYS 90 Tablet 3    sertraline (ZOLOFT) 100 MG Tab Take 1 Tablet by mouth every day. 90 Tablet 3    estradiol (ESTRACE) 0.5 MG tablet Take 1 Tablet by mouth every day. 30 Tablet 2    Influenza Virus Vacc Split PF 0.5 ML Suspension Prefilled Syringe Fluad 2018-19 65yr up(PF)45 mcg(15 mcgx3)/0.5 mL intramuscular syringe   ADM 0.5ML IM UTD      omeprazole (PRILOSEC) 20 MG delayed-release capsule Take 1 Capsule by mouth every day.      vitamin D (CHOLECALCIFEROL) 1000 UNIT Tab Take 1 Tablet by mouth every day.      Bimatoprost 0.03 % Solution apply as directed once a day (Patient not taking: Reported on 11/17/2022)       No current facility-administered medications for this visit.     She  has a past medical history of Anesthesia, Bronchitis (2013), Cataract, Heart burn, Hiatus hernia syndrome, High cholesterol, Indigestion, Other specified disorder of intestines, Pain, PONV (postoperative nausea and vomiting), Psychiatric problem, and Renal disorder.  Codeine     Social History and Family History were reviewed and updated.    ROS   No headaches, chest pain, no shortness of breath, abdominal pain, nausea, or vomiting.  All other systems were reviewed and are negative or noted as positive in the HPI.       Objective:     /76 (BP Location: Left arm, Patient  "Position: Sitting, BP Cuff Size: Adult)   Pulse 82   Temp 36.4 °C (97.5 °F) (Temporal)   Resp 16   Ht 1.727 m (5' 8\")   Wt 86.2 kg (190 lb)   SpO2 96%  Body mass index is 28.89 kg/m².     Physical Exam:  General: Patient appears well-nourished, well-hydrated, nontoxic  HEENT, normocephalic atraumatic, PERRLA, extraocular movements intact, nares are patent and clear  Neck: No visible masses, thyromegaly or abnormalities noted  Cardiovascular.  Sitting comfortably without visible signs of edema  Lungs: No cyanosis noted, nondyspneic  Skin: Well perfused without evidence of rash or lesions  Neurological: Cranial nerves II through XII intact, normal gait  Musculoskeletal: Normal range of motion, normal strength and no deficit noted     Clinical Course/Lab Analysis:        Assessment and Plan:   The following treatment plan was discussed.  Signs and symptoms for which to return were discussed with patient at length.  Patient verbalized understanding.    Problem List Items Addressed This Visit       HLD (hyperlipidemia)    Relevant Medications    ezetimibe (ZETIA) 10 MG Tab    Other Relevant Orders    LIPID PANEL     Other Visit Diagnoses       Preventative health care        Relevant Orders    Comp Metabolic Panel    CBC WITH DIFFERENTIAL    TSH WITH REFLEX TO FT4    HEMOGLOBIN A1C    Encounter for hepatitis C screening test for low risk patient        Relevant Orders    HEP C VIRUS ANTIBODY             HLD (hyperlipidemia)  Chronic: stable on zetia 10mg   refilled today    Menopausal symptoms  Chronic:  sac patient has gynecologist Dr. Danielle Nicholson.  She was put on estradiol 0.5 mg for menopausal symptoms. Hysterectomy in 2009     Diverticulosis  Taking zenpack to help IBS and for pancreatic insufficiency  Followup:    Recurrent major depressive disorder, in full remission (HCC)  Chronic and stable  - sertraline (ZOLOFT) 100 MG Tab; Take 1 Tablet by mouth every day.  Dispense: 90 Tablet; Refill: 3    IBS  Chronic: " unstable  Continue zenpep (amylase/lipase     Ocular migraines  Chronic and stable  Continue migraine supplementation    Tinnitus of both ears  Patient brought this up as she was walking out.  We will discuss further at next visit.  May benefit from ENT referral.  Did discuss importance of avoidance of salt, avoidance of NSAIDs and may need to get hearing checked  Please note that this dictation was created using voice recognition software. I have made every reasonable attempt to correct obvious errors, but I expect that there are errors of grammar and possibly content that I did not discover before finalizing the note.    8.  Preventative health care  Ordered patient preventative health labs including CBC, CMP, lipids, TSH

## 2022-11-21 ENCOUNTER — HOSPITAL ENCOUNTER (OUTPATIENT)
Dept: LAB | Facility: MEDICAL CENTER | Age: 72
End: 2022-11-21
Attending: PHYSICIAN ASSISTANT
Payer: MEDICARE

## 2022-11-21 DIAGNOSIS — Z00.00 PREVENTATIVE HEALTH CARE: ICD-10-CM

## 2022-11-21 DIAGNOSIS — Z11.59 ENCOUNTER FOR HEPATITIS C SCREENING TEST FOR LOW RISK PATIENT: ICD-10-CM

## 2022-11-21 LAB
ALBUMIN SERPL BCP-MCNC: 4.3 G/DL (ref 3.2–4.9)
ALBUMIN/GLOB SERPL: 1.7 G/DL
ALP SERPL-CCNC: 44 U/L (ref 30–99)
ALT SERPL-CCNC: 18 U/L (ref 2–50)
ANION GAP SERPL CALC-SCNC: 11 MMOL/L (ref 7–16)
AST SERPL-CCNC: 16 U/L (ref 12–45)
BASOPHILS # BLD AUTO: 0.4 % (ref 0–1.8)
BASOPHILS # BLD: 0.02 K/UL (ref 0–0.12)
BILIRUB SERPL-MCNC: 0.2 MG/DL (ref 0.1–1.5)
BUN SERPL-MCNC: 21 MG/DL (ref 8–22)
CALCIUM SERPL-MCNC: 8.9 MG/DL (ref 8.4–10.2)
CHLORIDE SERPL-SCNC: 107 MMOL/L (ref 96–112)
CHOLEST SERPL-MCNC: 213 MG/DL (ref 100–199)
CO2 SERPL-SCNC: 23 MMOL/L (ref 20–33)
CREAT SERPL-MCNC: 0.64 MG/DL (ref 0.5–1.4)
EOSINOPHIL # BLD AUTO: 0.25 K/UL (ref 0–0.51)
EOSINOPHIL NFR BLD: 4.8 % (ref 0–6.9)
ERYTHROCYTE [DISTWIDTH] IN BLOOD BY AUTOMATED COUNT: 45.4 FL (ref 35.9–50)
EST. AVERAGE GLUCOSE BLD GHB EST-MCNC: 100 MG/DL
FASTING STATUS PATIENT QL REPORTED: NORMAL
GFR SERPLBLD CREATININE-BSD FMLA CKD-EPI: 93 ML/MIN/1.73 M 2
GLOBULIN SER CALC-MCNC: 2.5 G/DL (ref 1.9–3.5)
GLUCOSE SERPL-MCNC: 110 MG/DL (ref 65–99)
HBA1C MFR BLD: 5.1 % (ref 4–5.6)
HCT VFR BLD AUTO: 39.3 % (ref 37–47)
HCV AB SER QL: NORMAL
HDLC SERPL-MCNC: 46 MG/DL
HGB BLD-MCNC: 13 G/DL (ref 12–16)
IMM GRANULOCYTES # BLD AUTO: 0.01 K/UL (ref 0–0.11)
IMM GRANULOCYTES NFR BLD AUTO: 0.2 % (ref 0–0.9)
LDLC SERPL CALC-MCNC: 122 MG/DL
LYMPHOCYTES # BLD AUTO: 1.55 K/UL (ref 1–4.8)
LYMPHOCYTES NFR BLD: 29.9 % (ref 22–41)
MCH RBC QN AUTO: 31 PG (ref 27–33)
MCHC RBC AUTO-ENTMCNC: 33.1 G/DL (ref 33.6–35)
MCV RBC AUTO: 93.8 FL (ref 81.4–97.8)
MONOCYTES # BLD AUTO: 0.32 K/UL (ref 0–0.85)
MONOCYTES NFR BLD AUTO: 6.2 % (ref 0–13.4)
NEUTROPHILS # BLD AUTO: 3.03 K/UL (ref 2–7.15)
NEUTROPHILS NFR BLD: 58.5 % (ref 44–72)
NRBC # BLD AUTO: 0 K/UL
NRBC BLD-RTO: 0 /100 WBC
PLATELET # BLD AUTO: 228 K/UL (ref 164–446)
PMV BLD AUTO: 9.5 FL (ref 9–12.9)
POTASSIUM SERPL-SCNC: 4.2 MMOL/L (ref 3.6–5.5)
PROT SERPL-MCNC: 6.8 G/DL (ref 6–8.2)
RBC # BLD AUTO: 4.19 M/UL (ref 4.2–5.4)
SODIUM SERPL-SCNC: 141 MMOL/L (ref 135–145)
TRIGL SERPL-MCNC: 226 MG/DL (ref 0–149)
TSH SERPL DL<=0.005 MIU/L-ACNC: 3.33 UIU/ML (ref 0.38–5.33)
WBC # BLD AUTO: 5.2 K/UL (ref 4.8–10.8)

## 2022-11-21 PROCEDURE — 83036 HEMOGLOBIN GLYCOSYLATED A1C: CPT

## 2022-11-21 PROCEDURE — 86803 HEPATITIS C AB TEST: CPT

## 2022-11-21 PROCEDURE — 84443 ASSAY THYROID STIM HORMONE: CPT

## 2022-11-21 PROCEDURE — 36415 COLL VENOUS BLD VENIPUNCTURE: CPT

## 2022-11-21 PROCEDURE — 85025 COMPLETE CBC W/AUTO DIFF WBC: CPT

## 2022-11-21 PROCEDURE — 80053 COMPREHEN METABOLIC PANEL: CPT

## 2022-11-21 PROCEDURE — 80061 LIPID PANEL: CPT

## 2023-01-11 ENCOUNTER — APPOINTMENT (OUTPATIENT)
Dept: RADIOLOGY | Facility: IMAGING CENTER | Age: 73
End: 2023-01-11
Attending: PHYSICIAN ASSISTANT
Payer: MEDICARE

## 2023-01-11 ENCOUNTER — OFFICE VISIT (OUTPATIENT)
Dept: URGENT CARE | Facility: CLINIC | Age: 73
End: 2023-01-11
Payer: MEDICARE

## 2023-01-11 VITALS
DIASTOLIC BLOOD PRESSURE: 62 MMHG | OXYGEN SATURATION: 95 % | TEMPERATURE: 98 F | RESPIRATION RATE: 16 BRPM | HEIGHT: 68 IN | SYSTOLIC BLOOD PRESSURE: 110 MMHG | BODY MASS INDEX: 28.79 KG/M2 | WEIGHT: 190 LBS | HEART RATE: 70 BPM

## 2023-01-11 DIAGNOSIS — M25.561 ACUTE PAIN OF RIGHT KNEE: ICD-10-CM

## 2023-01-11 PROCEDURE — 73562 X-RAY EXAM OF KNEE 3: CPT | Mod: TC,FY,RT | Performed by: EMERGENCY MEDICINE

## 2023-01-11 PROCEDURE — 99213 OFFICE O/P EST LOW 20 MIN: CPT | Performed by: PHYSICIAN ASSISTANT

## 2023-01-11 ASSESSMENT — FIBROSIS 4 INDEX: FIB4 SCORE: 1.190916684103658988

## 2023-01-11 NOTE — PROGRESS NOTES
"Subjective:   Chrissy Willoughby is a 72 y.o. female who presents for Knee Pain (Rt knee \"locks up\", swollen, doesn't move as the Lt knee x 1 week/)     This is a very pleasant 72-year-old female who presents with chief complaint of right knee swelling and pain.  She reports this occurred in atraumatic fashion.  She reports a sensation that her knee will lock up on occasion.  She denies any history of knee surgery.  She has tried over-the-counter analgesics.  Again, denies any traumatic event.  No strenuous activities.  She has not tried any over-the-counter medications for her pain.  She denies fevers chills or redness to the area.  No history of gout.    Medications:  Bimatoprost Soln  estradiol  ezetimibe Tabs  Influenza Virus Vacc Split PF Nanette  omeprazole  sertraline Tabs  Shingrix Susr  vitamin D Tabs  Zenpep Cpep    Allergies:             Codeine    Surgical History:         Past Surgical History:   Procedure Laterality Date    HIP ARTHROSCOPY Left 11/4/2019    Procedure: ARTHROSCOPY, HIP - W/DEBRIDEMENT, PSOAS RELEASE AND MCQUEEN;  Surgeon: Otis Lane M.D.;  Location: [facility];  Service: Orthopedics    SYNOVECTOMY Left 11/4/2019    Procedure: SYNOVECTOMY;  Surgeon: Otis Lane M.D.;  Location: [facility];  Service: Orthopedics    FEMORAL NECK OSTEOPLASTY Left 11/4/2019    Procedure: OSTEOPLASTY, FEMUR, NECK;  Surgeon: Otis Lane M.D.;  Location: [facility];  Service: Orthopedics    ACETABULAR OSTEOPLASTY Left 11/4/2019    Procedure: OSTEOPLASTY, ACETABULUM;  Surgeon: Otis Lane M.D.;  Location: [facility];  Service: Orthopedics    HIATAL HERNIA REPAIR  2018    PTOSIS REPAIR Bilateral 7/29/2015    Procedure: PTOSIS REPAIR-UPPER LID (POSTERIOR APPROACH);  Surgeon: Terence Nguyen M.D.;  Location: [facility];  Service:     TENSILON TEST  2/19/2015    Performed by Feliberto Pyle D.O. at ENDOSCOPY [facility] ORS " "   MODIFIED NIRSCHEL PROCEDURE  6/6/2013    Performed by Cate Alcaraz M.D. at SURGERY SAME DAY [facility] ORS    SHOULDER DECOMPRESSION ARTHROSCOPIC  6/6/2013    Performed by Cate Alcaraz M.D. at SURGERY SAME DAY [facility]    ELBOW EXPLORATION Right 2013    CERVICAL DISK AND FUSION ANTERIOR  10/20/2009    Performed by CATE ALCARAZ at SURGERY [facility] ORS    MENISCECTOMY Left 2009    left     ROTATOR CUFF REPAIR  2005    left     LUMBAR FUSION ANTERIOR  2005    HYSTERECTOMY LAPAROSCOPY      OTHER ORTHOPEDIC SURGERY      lumbar fusion anterior       Past Social Hx:  Chrissy Willoughby  reports that she has never smoked. She has never used smokeless tobacco. She reports that she does not currently use alcohol. She reports that she does not use drugs.     Past Family Hx:   Chrissy Willoughby family history is not on file.       Problem list, medications, and allergies reviewed by myself today in Epic.     Objective:     /62 (BP Location: Left arm, Patient Position: Sitting, BP Cuff Size: Adult)   Pulse 70   Temp 36.7 °C (98 °F) (Esophageal)   Resp 16   Ht 1.727 m (5' 8\")   Wt 86.2 kg (190 lb)   SpO2 95%   BMI 28.89 kg/m²     Physical Exam  Vitals and nursing note reviewed.   Constitutional:       General: She is not in acute distress.     Appearance: Normal appearance. She is not ill-appearing.   HENT:      Head: Normocephalic.   Eyes:      Extraocular Movements: Extraocular movements intact.      Pupils: Pupils are equal, round, and reactive to light.   Cardiovascular:      Rate and Rhythm: Normal rate.   Pulmonary:      Effort: Pulmonary effort is normal.   Musculoskeletal:      Comments: No obvious joint effusion present.  Generalized tenderness to the medial and lateral joint lines.  Negative drawer.  Full range of motion.  Mild soft tissue swelling.  No erythema or evidence of cellulitis or gouty arthritis.  Neurovascularly intact.  No significant bony tenderness.  Gait mildly " antalgic.   Skin:     General: Skin is warm.      Findings: No rash.   Neurological:      Mental Status: She is alert and oriented to person, place, and time.   Psychiatric:         Thought Content: Thought content normal.         Judgment: Judgment normal.       RADIOLOGY RESULTS   DX-KNEE 3 VIEWS RIGHT    Result Date: 1/11/2023 1/11/2023 3:35 PM HISTORY/REASON FOR EXAM:  Atraumatic Pain/Swelling/Deformity. TECHNIQUE/EXAM DESCRIPTION AND NUMBER OF VIEWS:  3 views of the RIGHT knee. COMPARISON: None FINDINGS: There is no fracture or dislocation. The visualized osseous structures are in anatomic alignment. Joint spaces are preserved. Bone mineralization is age-appropriate.. No significant joint effusion.     Normal.         *X-rays were reviewed and interpreted independently by me. I agree with the radiologist's findings     Assessment/Plan:     Diagnosis and Associated Orders:     1. Acute pain of right knee  - DX-KNEE 3 VIEWS RIGHT; Future        Comments/MDM:    Knee x-rays without radiographic abnormality.  No significant joint effusion or osteoarthritis.  Given history of locking sensation do suspect possible soft tissue involvement or internal derangement.  Will place referral to orthopedics, patient prefers [facility].  Recommend trial of ibuprofen/Tylenol, elevation, ice.  Discussed these options with patient.  No sign of infectious process or gouty process.  Vital signs stable and reassuring.  Return precautions discussed    I personally reviewed prior external notes and test results pertinent to today's visit.  Red flags discussed as well as indications to present to the Emergency Department.  Supportive care, natural history, differential diagnoses, and indications for immediate follow-up discussed.  Patient expresses understanding and agrees to plan.  Patient denies any other questions or concerns.    Follow-up with the primary care physician for recheck, reevaluation, and consideration of further  management.      Please note that this dictation was created using voice recognition software. I have made a reasonable attempt to correct obvious errors, but I expect that there are errors of grammar and possibly content that I did not discover before finalizing the note.    This note was electronically signed by Delores Turner PA-C

## 2023-03-22 ENCOUNTER — TELEPHONE (OUTPATIENT)
Dept: HEALTH INFORMATION MANAGEMENT | Facility: OTHER | Age: 73
End: 2023-03-22

## 2023-05-19 ENCOUNTER — APPOINTMENT (OUTPATIENT)
Dept: ADMISSIONS | Facility: MEDICAL CENTER | Age: 73
End: 2023-05-19
Attending: ORTHOPAEDIC SURGERY
Payer: MEDICARE

## 2023-06-05 ENCOUNTER — OFFICE VISIT (OUTPATIENT)
Dept: MEDICAL GROUP | Facility: MEDICAL CENTER | Age: 73
End: 2023-06-05
Payer: MEDICARE

## 2023-06-05 ENCOUNTER — HOSPITAL ENCOUNTER (OUTPATIENT)
Dept: RADIOLOGY | Facility: MEDICAL CENTER | Age: 73
End: 2023-06-05
Attending: PHYSICIAN ASSISTANT
Payer: MEDICARE

## 2023-06-05 VITALS
WEIGHT: 190.4 LBS | HEART RATE: 71 BPM | TEMPERATURE: 97.3 F | RESPIRATION RATE: 20 BRPM | BODY MASS INDEX: 28.85 KG/M2 | HEIGHT: 68 IN | OXYGEN SATURATION: 94 % | DIASTOLIC BLOOD PRESSURE: 82 MMHG | SYSTOLIC BLOOD PRESSURE: 126 MMHG

## 2023-06-05 DIAGNOSIS — S83.206A TEAR OF MENISCUS OF RIGHT KNEE AS CURRENT INJURY, UNSPECIFIED MENISCUS, UNSPECIFIED TEAR TYPE, INITIAL ENCOUNTER: ICD-10-CM

## 2023-06-05 DIAGNOSIS — Z02.89 ENCOUNTER FOR COMPLETION OF FORM WITH PATIENT: ICD-10-CM

## 2023-06-05 DIAGNOSIS — F33.42 RECURRENT MAJOR DEPRESSIVE DISORDER, IN FULL REMISSION (HCC): ICD-10-CM

## 2023-06-05 DIAGNOSIS — M54.50 CHRONIC BILATERAL LOW BACK PAIN WITHOUT SCIATICA: ICD-10-CM

## 2023-06-05 DIAGNOSIS — Z01.818 PREOPERATIVE GENERAL PHYSICAL EXAMINATION: ICD-10-CM

## 2023-06-05 DIAGNOSIS — E78.2 MIXED HYPERLIPIDEMIA: ICD-10-CM

## 2023-06-05 DIAGNOSIS — G89.29 CHRONIC BILATERAL LOW BACK PAIN WITHOUT SCIATICA: ICD-10-CM

## 2023-06-05 DIAGNOSIS — N95.1 MENOPAUSAL SYMPTOMS: ICD-10-CM

## 2023-06-05 PROCEDURE — 99215 OFFICE O/P EST HI 40 MIN: CPT | Performed by: PHYSICIAN ASSISTANT

## 2023-06-05 PROCEDURE — 71046 X-RAY EXAM CHEST 2 VIEWS: CPT

## 2023-06-05 PROCEDURE — 3079F DIAST BP 80-89 MM HG: CPT | Performed by: PHYSICIAN ASSISTANT

## 2023-06-05 PROCEDURE — 3074F SYST BP LT 130 MM HG: CPT | Performed by: PHYSICIAN ASSISTANT

## 2023-06-05 RX ORDER — GABAPENTIN 100 MG/1
100 CAPSULE ORAL 3 TIMES DAILY
COMMUNITY

## 2023-06-05 RX ORDER — GABAPENTIN 100 MG/1
CAPSULE ORAL
COMMUNITY
Start: 2023-05-30 | End: 2023-06-05

## 2023-06-05 ASSESSMENT — PATIENT HEALTH QUESTIONNAIRE - PHQ9: CLINICAL INTERPRETATION OF PHQ2 SCORE: 0

## 2023-06-05 ASSESSMENT — FIBROSIS 4 INDEX: FIB4 SCORE: 1.21

## 2023-06-05 NOTE — PROGRESS NOTES
REASON FOR VISIT: Pre-Op Consultation  Consultation Requested by:   Procedure date and type: [facility] orthopedics.  The date of the procedure is June 29, 2023    History of condition for which surgery is planned: Right knee meniscal tear repair this will be done right knee meniscal arthroplasty    Current chronic conditions: does not have any pertinent problems on file.  Past medical history:  has a past medical history of Anesthesia, Bronchitis (2013), Cataract, Heart burn, Hiatus hernia syndrome, High cholesterol, Indigestion, Other specified disorder of intestines, Pain, PONV (postoperative nausea and vomiting), Psychiatric problem, and Renal disorder.. Negative for: CAD, SBE, CVA, TIA, DVT, PE, bleeding requiring transfusion, intubation.  Surgical and anesthetic history:  has a past surgical history that includes cervical disk and fusion anterior (10/20/2009); modified nirschel procedure (6/6/2013); hysterectomy laparoscopy; rotator cuff repair (2005); meniscectomy (Left, 2009); other orthopedic surgery; shoulder decompression arthroscopic (6/6/2013); tensilon test (2/19/2015); ptosis repair (Bilateral, 7/29/2015); lumbar fusion anterior (2005); elbow exploration (Right, 2013); hiatal hernia repair (2018); hip arthroscopy (Left, 11/4/2019); synovectomy (Left, 11/4/2019); femoral neck osteoplasty (Left, 11/4/2019); and acetabular osteoplasty (Left, 11/4/2019). Prior surgery without complication, bleeding, reaction to anesthetic, prolonged recovery  Habits:   Social History     Tobacco Use    Smoking status: Never    Smokeless tobacco: Never   Vaping Use    Vaping Use: Never used   Substance Use Topics    Alcohol use: Not Currently    Drug use: No     Allergies: Codeine No known allergy to Anesthetic, or Latex.     Current medicines:   Current Outpatient Medications   Medication Sig Dispense Refill    gabapentin (NEURONTIN) 100 MG Cap Take 100 mg by mouth 3 times a day.      ZENPEP  "12044-260214 units Cap DR Particles TAKE 2 CAPSULES BY MOUTH BEFORE EVERY MEAL AND 1 CAPSULE BEFORE EVERY SNACK      Zoster Vac Recomb Adjuvanted (SHINGRIX) 50 MCG/0.5ML Recon Susp Shingrix (PF) 50 mcg/0.5 mL intramuscular suspension, kit   PHARMACIST ADMINISTERED IMMUNIZATION ADMINISTERED AT TIME OF DISPENSING      ezetimibe (ZETIA) 10 MG Tab Take 1 Tablet by mouth every day. FOR 90 DAYS 90 Tablet 3    sertraline (ZOLOFT) 100 MG Tab Take 1 Tablet by mouth every day. 90 Tablet 3    estradiol (ESTRACE) 0.5 MG tablet Take 1 Tablet by mouth every day. 30 Tablet 2    Influenza Virus Vacc Split PF 0.5 ML Suspension Prefilled Syringe Fluad 2018-19 65yr up(PF)45 mcg(15 mcgx3)/0.5 mL intramuscular syringe   ADM 0.5ML IM UTD      omeprazole (PRILOSEC) 20 MG delayed-release capsule Take 1 Capsule by mouth every day.      vitamin D (CHOLECALCIFEROL) 1000 UNIT Tab Take 1 Tablet by mouth every day.       No current facility-administered medications for this visit.     Anticoagulant: ASA, NSAIDs patient is not taking any anticoagulants or NSAIDs and is aware that she is not to  Herbals: Black Cohash, Echinacea, Garlic, Ashley, Ginkgo Biloba, Ginseng, Kava, Nohemi’s Wort,  Saw Palmetto, Valerian             ROS: negative for: CP, SOB, OROZCO, Orthopnea, wheezing, leg edema, polydipsia, polyuria, fevers, chills, sweats, cough, cold, congestion, abd pain, reflux, black or bloody stools, weight loss/gain.  Functional Status: ambulatory, uses assistive device, prolonged immobility, lives alone, requires PT/FT care    PHYSICAL EXAMINATION:  VITAL SIGNS: /82 (BP Location: Right arm, Patient Position: Sitting, BP Cuff Size: Adult)   Pulse 71   Temp 36.3 °C (97.3 °F) (Temporal)   Resp 20   Ht 1.727 m (5' 8\")   Wt 86.4 kg (190 lb 6.4 oz)   SpO2 94%  Body mass index is 28.95 kg/m².  HEENT: EOMI, PERRL. Oropharynx pink, moist. Mallampati: I (soft palate, uvula, fauces, tonsillar pillars visible). Neck supple, no cervical " lymphadenopathy.   LUNGS: CTAB good excursion.   HEART: RRR no murmur.  ABDOMEN: soft, nondistended, nontender normal BS. No HSM.  LOWER EXTREMITIES: warm and well perfused with no edema.      Labs: See attached.  EKG was done and reviewed by me.  Showed sinus bradycardia with a rate of 58.  There are no ST changes.  EKG looks good.  Chest x-ray is pending    IMPRESSION:  Diagnoses of Tear of meniscus of right knee as current injury, unspecified meniscus, unspecified tear type, initial encounter, Recurrent major depressive disorder, in full remission (HCC), Menopausal symptoms, Mixed hyperlipidemia, Chronic bilateral low back pain without sciatica, Encounter for completion of form with patient, and Preoperative general physical examination were pertinent to this visit.  Planned surgery: Right knee meniscal arthroplasty  High risk medical conditions: negative for Cardiac, Pulmonary, Bleeding, Poor healing, Thrombosis, Debility    PLAN:  Chronic medical conditions: Stable and controlled. Continue current medicines.   Avoid drugs that potentiate bleeding as advised by surgeon  Discontinue all herbal supplements 2 weeks prior to surgery.  Need for SBE prophylaxis: No  This patient is considered Low risk  Sachin Index for assessing perioperative cardiovascular risk of 1.  0.9% risk    My total time spent caring for the patient on the day of the encounter was 46 minutes.  Doing preop physical exam as well as ordering and reviewing EKG with patient.  Chest x-ray is pending.  Also sending note and pertinent clinical information requesting physician Dr. Benigno Porras    This does not include time spent on separately billable procedures/tests.

## 2023-06-07 ENCOUNTER — PRE-ADMISSION TESTING (OUTPATIENT)
Dept: ADMISSIONS | Facility: MEDICAL CENTER | Age: 73
End: 2023-06-07
Attending: ORTHOPAEDIC SURGERY
Payer: MEDICARE

## 2023-06-07 VITALS — HEIGHT: 68 IN | BODY MASS INDEX: 28.95 KG/M2

## 2023-06-07 RX ORDER — BIOTIN 10 MG
TABLET ORAL DAILY
COMMUNITY

## 2023-06-07 NOTE — PREPROCEDURE INSTRUCTIONS
Pre admit apt: Pt. Instructed to continue regularly prescribed medications through day before surgery.  Instructed to take the following medications, the day of surgery, with a sip of water per anesthesia protocol: omeprazole  METS -4   Pt has PONV with anesthesia, slow to wake up  Instructed pt to notify Dr. Porras if she develops any new sxs of illness/covid prior to surgery.

## 2023-06-09 ENCOUNTER — APPOINTMENT (OUTPATIENT)
Dept: ADMISSIONS | Facility: MEDICAL CENTER | Age: 73
End: 2023-06-09
Attending: ORTHOPAEDIC SURGERY
Payer: MEDICARE

## 2023-06-09 DIAGNOSIS — Z01.812 PRE-OPERATIVE LABORATORY EXAMINATION: ICD-10-CM

## 2023-06-09 LAB
ERYTHROCYTE [DISTWIDTH] IN BLOOD BY AUTOMATED COUNT: 47.9 FL (ref 35.9–50)
HCT VFR BLD AUTO: 38.6 % (ref 37–47)
HGB BLD-MCNC: 13 G/DL (ref 12–16)
MCH RBC QN AUTO: 31.9 PG (ref 27–33)
MCHC RBC AUTO-ENTMCNC: 33.7 G/DL (ref 32.2–35.5)
MCV RBC AUTO: 94.8 FL (ref 81.4–97.8)
PLATELET # BLD AUTO: 250 K/UL (ref 164–446)
PMV BLD AUTO: 9.5 FL (ref 9–12.9)
RBC # BLD AUTO: 4.07 M/UL (ref 4.2–5.4)
WBC # BLD AUTO: 6.6 K/UL (ref 4.8–10.8)

## 2023-06-09 PROCEDURE — 36415 COLL VENOUS BLD VENIPUNCTURE: CPT

## 2023-06-09 PROCEDURE — 85027 COMPLETE CBC AUTOMATED: CPT

## 2023-06-27 ENCOUNTER — HOSPITAL ENCOUNTER (OUTPATIENT)
Facility: MEDICAL CENTER | Age: 73
End: 2023-06-27
Attending: ORTHOPAEDIC SURGERY | Admitting: ORTHOPAEDIC SURGERY
Payer: MEDICARE

## 2023-06-27 ENCOUNTER — ANESTHESIA (OUTPATIENT)
Dept: SURGERY | Facility: MEDICAL CENTER | Age: 73
End: 2023-06-27
Payer: MEDICARE

## 2023-06-27 ENCOUNTER — ANESTHESIA EVENT (OUTPATIENT)
Dept: SURGERY | Facility: MEDICAL CENTER | Age: 73
End: 2023-06-27
Payer: MEDICARE

## 2023-06-27 VITALS
BODY MASS INDEX: 28.13 KG/M2 | OXYGEN SATURATION: 94 % | DIASTOLIC BLOOD PRESSURE: 54 MMHG | TEMPERATURE: 97.9 F | RESPIRATION RATE: 16 BRPM | HEIGHT: 68 IN | WEIGHT: 185.63 LBS | HEART RATE: 65 BPM | SYSTOLIC BLOOD PRESSURE: 150 MMHG

## 2023-06-27 PROBLEM — T88.59XA DELAYED EMERGENCE FROM ANESTHESIA: Status: ACTIVE | Noted: 2023-06-27

## 2023-06-27 PROBLEM — Z98.890 PONV (POSTOPERATIVE NAUSEA AND VOMITING): Status: ACTIVE | Noted: 2023-06-27

## 2023-06-27 PROBLEM — R11.2 PONV (POSTOPERATIVE NAUSEA AND VOMITING): Status: ACTIVE | Noted: 2023-06-27

## 2023-06-27 PROCEDURE — 99100 ANES PT EXTEME AGE<1 YR&>70: CPT | Performed by: ANESTHESIOLOGY

## 2023-06-27 PROCEDURE — 160035 HCHG PACU - 1ST 60 MINS PHASE I: Performed by: ORTHOPAEDIC SURGERY

## 2023-06-27 PROCEDURE — 700102 HCHG RX REV CODE 250 W/ 637 OVERRIDE(OP): Performed by: ANESTHESIOLOGY

## 2023-06-27 PROCEDURE — 700105 HCHG RX REV CODE 258: Performed by: ORTHOPAEDIC SURGERY

## 2023-06-27 PROCEDURE — 160009 HCHG ANES TIME/MIN: Performed by: ORTHOPAEDIC SURGERY

## 2023-06-27 PROCEDURE — 160038 HCHG SURGERY MINUTES - EA ADDL 1 MIN LEVEL 2: Performed by: ORTHOPAEDIC SURGERY

## 2023-06-27 PROCEDURE — 01400 ANES OPN/ARTHRS KNEE JT NOS: CPT | Performed by: ANESTHESIOLOGY

## 2023-06-27 PROCEDURE — 160002 HCHG RECOVERY MINUTES (STAT): Performed by: ORTHOPAEDIC SURGERY

## 2023-06-27 PROCEDURE — 700111 HCHG RX REV CODE 636 W/ 250 OVERRIDE (IP): Performed by: ANESTHESIOLOGY

## 2023-06-27 PROCEDURE — 160048 HCHG OR STATISTICAL LEVEL 1-5: Performed by: ORTHOPAEDIC SURGERY

## 2023-06-27 PROCEDURE — 160027 HCHG SURGERY MINUTES - 1ST 30 MINS LEVEL 2: Performed by: ORTHOPAEDIC SURGERY

## 2023-06-27 PROCEDURE — A9270 NON-COVERED ITEM OR SERVICE: HCPCS | Performed by: ANESTHESIOLOGY

## 2023-06-27 PROCEDURE — 700101 HCHG RX REV CODE 250: Performed by: ANESTHESIOLOGY

## 2023-06-27 PROCEDURE — 160025 RECOVERY II MINUTES (STATS): Performed by: ORTHOPAEDIC SURGERY

## 2023-06-27 PROCEDURE — 160046 HCHG PACU - 1ST 60 MINS PHASE II: Performed by: ORTHOPAEDIC SURGERY

## 2023-06-27 PROCEDURE — 700101 HCHG RX REV CODE 250: Performed by: ORTHOPAEDIC SURGERY

## 2023-06-27 RX ORDER — ONDANSETRON 2 MG/ML
4 INJECTION INTRAMUSCULAR; INTRAVENOUS
Status: DISCONTINUED | OUTPATIENT
Start: 2023-06-27 | End: 2023-06-27 | Stop reason: HOSPADM

## 2023-06-27 RX ORDER — LIDOCAINE HYDROCHLORIDE 20 MG/ML
INJECTION, SOLUTION EPIDURAL; INFILTRATION; INTRACAUDAL; PERINEURAL PRN
Status: DISCONTINUED | OUTPATIENT
Start: 2023-06-27 | End: 2023-06-27 | Stop reason: SURG

## 2023-06-27 RX ORDER — SODIUM CHLORIDE, SODIUM LACTATE, POTASSIUM CHLORIDE, CALCIUM CHLORIDE 600; 310; 30; 20 MG/100ML; MG/100ML; MG/100ML; MG/100ML
INJECTION, SOLUTION INTRAVENOUS CONTINUOUS
Status: DISCONTINUED | OUTPATIENT
Start: 2023-06-27 | End: 2023-06-27 | Stop reason: HOSPADM

## 2023-06-27 RX ORDER — ACETAMINOPHEN 500 MG
1000 TABLET ORAL ONCE
Status: COMPLETED | OUTPATIENT
Start: 2023-06-27 | End: 2023-06-27

## 2023-06-27 RX ORDER — CEFAZOLIN SODIUM 1 G/3ML
INJECTION, POWDER, FOR SOLUTION INTRAMUSCULAR; INTRAVENOUS PRN
Status: DISCONTINUED | OUTPATIENT
Start: 2023-06-27 | End: 2023-06-27 | Stop reason: SURG

## 2023-06-27 RX ORDER — ONDANSETRON 2 MG/ML
INJECTION INTRAMUSCULAR; INTRAVENOUS PRN
Status: DISCONTINUED | OUTPATIENT
Start: 2023-06-27 | End: 2023-06-27 | Stop reason: SURG

## 2023-06-27 RX ORDER — MEPERIDINE HYDROCHLORIDE 25 MG/ML
12.5 INJECTION INTRAMUSCULAR; INTRAVENOUS; SUBCUTANEOUS
Status: DISCONTINUED | OUTPATIENT
Start: 2023-06-27 | End: 2023-06-27 | Stop reason: HOSPADM

## 2023-06-27 RX ORDER — SCOLOPAMINE TRANSDERMAL SYSTEM 1 MG/1
PATCH, EXTENDED RELEASE TRANSDERMAL
Status: COMPLETED
Start: 2023-06-27 | End: 2023-06-27

## 2023-06-27 RX ORDER — TRAMADOL HYDROCHLORIDE 50 MG/1
50 TABLET ORAL EVERY 6 HOURS PRN
Status: DISCONTINUED | OUTPATIENT
Start: 2023-06-27 | End: 2023-06-27 | Stop reason: HOSPADM

## 2023-06-27 RX ORDER — BUPIVACAINE HYDROCHLORIDE 5 MG/ML
INJECTION, SOLUTION EPIDURAL; INTRACAUDAL
Status: DISCONTINUED | OUTPATIENT
Start: 2023-06-27 | End: 2023-06-27 | Stop reason: HOSPADM

## 2023-06-27 RX ORDER — SCOLOPAMINE TRANSDERMAL SYSTEM 1 MG/1
PATCH, EXTENDED RELEASE TRANSDERMAL PRN
Status: DISCONTINUED | OUTPATIENT
Start: 2023-06-27 | End: 2023-06-27 | Stop reason: SURG

## 2023-06-27 RX ORDER — KETOROLAC TROMETHAMINE 30 MG/ML
INJECTION, SOLUTION INTRAMUSCULAR; INTRAVENOUS PRN
Status: DISCONTINUED | OUTPATIENT
Start: 2023-06-27 | End: 2023-06-27 | Stop reason: SURG

## 2023-06-27 RX ORDER — DEXAMETHASONE SODIUM PHOSPHATE 4 MG/ML
INJECTION, SOLUTION INTRA-ARTICULAR; INTRALESIONAL; INTRAMUSCULAR; INTRAVENOUS; SOFT TISSUE PRN
Status: DISCONTINUED | OUTPATIENT
Start: 2023-06-27 | End: 2023-06-27 | Stop reason: SURG

## 2023-06-27 RX ADMIN — LIDOCAINE HYDROCHLORIDE 70 MG: 20 INJECTION, SOLUTION EPIDURAL; INFILTRATION; INTRACAUDAL at 14:50

## 2023-06-27 RX ADMIN — ACETAMINOPHEN 1000 MG: 500 TABLET ORAL at 12:52

## 2023-06-27 RX ADMIN — SCOPOLAMINE 1 PATCH: 1.5 PATCH, EXTENDED RELEASE TRANSDERMAL at 12:50

## 2023-06-27 RX ADMIN — FENTANYL CITRATE 50 MCG: 50 INJECTION, SOLUTION INTRAMUSCULAR; INTRAVENOUS at 15:03

## 2023-06-27 RX ADMIN — PROPOFOL 120 MG: 10 INJECTION, EMULSION INTRAVENOUS at 14:50

## 2023-06-27 RX ADMIN — DEXAMETHASONE SODIUM PHOSPHATE 8 MG: 4 INJECTION INTRA-ARTICULAR; INTRALESIONAL; INTRAMUSCULAR; INTRAVENOUS; SOFT TISSUE at 14:57

## 2023-06-27 RX ADMIN — CEFAZOLIN 2 G: 1 INJECTION, POWDER, FOR SOLUTION INTRAMUSCULAR; INTRAVENOUS at 14:50

## 2023-06-27 RX ADMIN — SODIUM CHLORIDE, POTASSIUM CHLORIDE, SODIUM LACTATE AND CALCIUM CHLORIDE: 600; 310; 30; 20 INJECTION, SOLUTION INTRAVENOUS at 13:15

## 2023-06-27 RX ADMIN — SODIUM CHLORIDE, POTASSIUM CHLORIDE, SODIUM LACTATE AND CALCIUM CHLORIDE: 600; 310; 30; 20 INJECTION, SOLUTION INTRAVENOUS at 15:28

## 2023-06-27 RX ADMIN — FENTANYL CITRATE 25 MCG: 50 INJECTION, SOLUTION INTRAMUSCULAR; INTRAVENOUS at 15:17

## 2023-06-27 RX ADMIN — PROPOFOL 150 MCG/KG/MIN: 10 INJECTION, EMULSION INTRAVENOUS at 14:51

## 2023-06-27 RX ADMIN — FENTANYL CITRATE 50 MCG: 50 INJECTION, SOLUTION INTRAMUSCULAR; INTRAVENOUS at 14:50

## 2023-06-27 RX ADMIN — ONDANSETRON 4 MG: 2 INJECTION INTRAMUSCULAR; INTRAVENOUS at 15:24

## 2023-06-27 RX ADMIN — KETOROLAC TROMETHAMINE 15 MG: 30 INJECTION, SOLUTION INTRAMUSCULAR; INTRAVENOUS at 15:05

## 2023-06-27 RX ADMIN — FENTANYL CITRATE 25 MCG: 50 INJECTION, SOLUTION INTRAMUSCULAR; INTRAVENOUS at 15:31

## 2023-06-27 ASSESSMENT — PAIN DESCRIPTION - PAIN TYPE
TYPE: SURGICAL PAIN

## 2023-06-27 ASSESSMENT — FIBROSIS 4 INDEX: FIB4 SCORE: 1.1

## 2023-06-27 ASSESSMENT — PAIN SCALES - GENERAL: PAIN_LEVEL: 4

## 2023-06-27 NOTE — DISCHARGE INSTRUCTIONS
What to Expect Post Anesthesia    Rest and take it easy for the first 24 hours.  A responsible adult is recommended to remain with you during that time.  It is normal to feel sleepy.  We encourage you to not do anything that requires balance, judgment or coordination.    FOR 24 HOURS DO NOT:  Drive, operate machinery or run household appliances.  Drink beer or alcoholic beverages.  Make important decisions or sign legal documents.    To avoid nausea, slowly advance diet as tolerated, avoiding spicy or greasy foods for the first day.  Add more substantial food to your diet according to your provider's instructions.  Babies can be fed formula or breast milk as soon as they are hungry.  INCREASE FLUIDS AND FIBER TO AVOID CONSTIPATION.    MILD FLU-LIKE SYMPTOMS ARE NORMAL.  YOU MAY EXPERIENCE GENERALIZED MUSCLE ACHES, THROAT IRRITATION, HEADACHE AND/OR SOME NAUSEA.    If any questions arise, call your provider.  If your provider is not available, please feel free to call the Surgical Center at [phone].    MEDICATIONS: Resume taking daily medication.  Take prescribed pain medication with food.  If no medication is prescribed, you may take non-aspirin pain medication if needed.  PAIN MEDICATION CAN BE VERY CONSTIPATING.  Take a stool softener or laxative such as senokot, pericolace, or milk of magnesia if needed.    Last pain medication given at: N/A    Diet  Resume your normal diet as tolerated.  A diet low in cholesterol, fat, and sodium is recommended for good health.     Post Op Knee Discharge Instructions  Activity:   You may put full weight on your leg as you feel comfortable.  Crutches may be used to help you walk but are not absolutely necessary unless otherwise indicated.  You may work on flexing and extending your knee immediately as your pain level allows.    Dressing and Wound Care:    Keep your knee dressing clean and dry after surgery.  Be aware that some spotting of the dressing with blood can occur  and is normal.  You may remove this dressing 3 days after the operation.  Notice that you have two or three small incisions that have been closed with stitches or in some cases one larger incision.  Cover each of these incisions with band-aids or non-adherent gauze, and wrap the knee with an ace bandage.  Band-aids / gauze pads should be changed and the knee re-wrapped each day.    Shower / Bathing:    Keep the knee covered and dry for 3 days after your surgery.  Then, you may shower as long as your incisions are completely dry and not draining any fluid.  Do not soak the knee in water.Swimming pools, hot tubs, or baths are to be avoided until after your follow up and then only if cleared by your surgeon.     Apply Ice Pack to Knee:   Apply ice packs to your knee (15 minutes on the knee, 15 minutes off the knee) for the first week, as you feel necessary to help with the pain and swelling.    Elevation:   Keep your knee and foot elevated as much as possible to control swelling.  Be aware that a mild-moderate amount of knee swelling is expected.      SWELLING/BRUISING  Swelling is an expected response to surgery. To reduce swelling, elevate your surgical limb above heart level multiple times per day and apply ice (see Ice instructions). If your swelling becomes excessive, is limiting your ability to move, or becomes worrisome please contact your doctor's office.    Bruising often does not appear until after you arrive home and can be quite dramatic-appearing purple, black, or green. Bruising is typically not concerning and will subside without any treatment.     Scopolamine Patches  What is this medication?  SCOPOLAMINE (skoe KIZZY a flex) prevents nausea and vomiting. It works by blocking substances in your body that may cause nausea and vomiting. It belongs to a class of medications called antiemetics.  This medicine may be used for other purposes; ask your health care provider or pharmacist if you have  questions.  COMMON BRAND NAME(S): Transderm Scop  What should I tell my care team before I take this medication?  They need to know if you have any of these conditions:  Are scheduled to have a gastric secretion test  Glaucoma  Heart disease  Kidney disease  Liver disease  Lung or breathing disease, such as asthma  Mental health condition  Prostate disease  Seizures  Stomach or intestine problems  Trouble passing urine  An unusual or allergic reaction to scopolamine, atropine, other medications, foods, dyes, or preservatives  Pregnant or trying to get pregnant  Breast-feeding  How should I use this medication?  This medication is for external use only. Follow the directions on the prescription label. Wear only 1 patch at a time. Choose an area behind the ear, that is clean, dry, hairless and free from any cuts or irritation. Wipe the area with a clean dry tissue. Peel off the plastic backing of the skin patch, trying not to touch the adhesive side with your hands. Do not cut the patches. Firmly apply to the area you have chosen, with the metallic side of the patch to the skin and the tan-colored side showing. Once firmly in place, wash your hands well with soap and water. Do not get this medication in your eyes.  After removing the patch, wash your hands and the area behind your ear thoroughly with soap and water. The patch will still contain some medication after use. To avoid accidental contact or ingestion by children or pets, fold the used patch in half with the sticky side together and throw away in the trash out of the reach of children and pets. If you need to use a second patch after you remove the first, place it behind the other ear.  A special MedGuide will be given to you by the pharmacist with each prescription and refill. Be sure to read this information carefully each time.  Talk to your care team about the use of this medication in children. Special care may be needed.  Overdosage: If you think you  have taken too much of this medicine contact a poison control center or emergency room at once.  NOTE: This medicine is only for you. Do not share this medicine with others.  What if I miss a dose?  This does not apply. This medication is not for regular use.  What may interact with this medication?  Alcohol  Antihistamines for allergy cough and cold  Atropine  Certain medications for anxiety or sleep  Certain medications for bladder problems, such as oxybutynin, tolterodine  Certain medications for depression, such as amitriptyline, fluoxetine, sertraline  Certain medications for Parkinson disease, such as benztropine, trihexyphenidyl  Certain medications for seizures, such as phenobarbital, primidone  Certain medications for stomach problems, such as dicyclomine, hyoscyamine  General anesthetics, such as halothane, isoflurane, methoxyflurane, propofol  Ipratropium  Local anesthetics, such as lidocaine, pramoxine, tetracaine  Medications that relax muscles for surgery  Opioid medications for pain  Other belladonna alkaloids  Phenothiazines, such as chlorpromazine, mesoridazine, prochlorperazine, thioridazine  This list may not describe all possible interactions. Give your health care provider a list of all the medicines, herbs, non-prescription drugs, or dietary supplements you use. Also tell them if you smoke, drink alcohol, or use illegal drugs. Some items may interact with your medicine.  What should I watch for while using this medication?  Limit contact with water while swimming and bathing because the patch may fall off. If the patch falls off, throw it away and put a new one behind the other ear.  This medication may affect your coordination, reaction time, or judgment. Do not drive or operate machinery until you know how this medication affects you. Sit up or stand slowly to reduce the risk of dizzy or fainting spells. Drinking alcohol with this medication can increase the risk of these side effects.  Your  mouth may get dry. Chewing sugarless gum or sucking hard candy, and drinking plenty of water may help. Contact your care team if the problem does not go away or is severe.  This medication may cause dry eyes and blurred vision. If you wear contact lenses, you may feel some discomfort. Lubricating drops may help. See your care team if the problem does not go away or is severe.  If you are going to need surgery, an MRI, CT scan, or other procedure, tell your care team that you are using this medication. You may need to remove the patch before the procedure.  What side effects may I notice from receiving this medication?  Side effects that you should report to your care team as soon as possible:  Allergic reactions--skin rash, itching, hives, swelling of the face, lips, tongue, or throat  Constipation, bloating, nausea or vomiting, stomach pain, which may be signs of slow movement through the digestive tract  Mood and behavior changes--anxiety, nervousness, confusion, hallucinations, irritability, hostility, thoughts of suicide or self-harm, worsening mood, feelings of depression  Seizures  Sudden eye pain or change in vision such as blurry vision, seeing halos around lights, vision loss  Trouble passing urine  Side effects that usually do not require medical attention (report to your care team if they continue or are bothersome):  Confusion  Dizziness  Drowsiness  Dry mouth  Sore throat  This list may not describe all possible side effects. Call your doctor for medical advice about side effects. You may report side effects to FDA at 4-096-FDA-6906.  Where should I keep my medication?  Keep out of the reach of children and pets.  Store at room temperature between 20 and 25 degrees C (68 and 77 degrees F). Keep this medication in the foil package until ready to use.  Get rid of any unused medication after the expiration date.  NOTE: This sheet is a summary. It may not cover all possible information. If you have  questions about this medicine, talk to your doctor, pharmacist, or health care provider.  © 2023 [employer]/Gold Standard (2022-11-09 00:00:00)

## 2023-06-27 NOTE — ANESTHESIA PROCEDURE NOTES
Airway    Date/Time: 6/27/2023 2:52 PM    Performed by: Jane Angel M.D.  Authorized by: Jane Angel M.D.    Location:  OR  Urgency:  Elective  Indications for Airway Management:  Anesthesia      Spontaneous Ventilation: absent    Sedation Level:  Deep  Preoxygenated: Yes    Mask Difficulty Assessment:  0 - not attempted  Final Airway Type:  Supraglottic airway  Final Supraglottic Airway:  Standard LMA    SGA Size:  4  Number of Attempts at Approach:  1

## 2023-06-27 NOTE — OR SURGEON
Immediate Post OP Note    PreOp Diagnosis: right knee lateral meniscus tear      PostOp Diagnosis: right knee lateral and medial meniscus tear, engaging plica and synovitis       Procedure(s):  RIGHT KNEE ARTHROSCOPY, PARTIAL MEDIAL PARTIAL LATERAL MENISCECTOMY AND LIMITED SiNLVECTOMY - Wound Class: Clean    Surgeon(s):  Benigno Porras M.D.    Anesthesiologist/Type of Anesthesia:  Anesthesiologist: Jane Angel M.D./General    Surgical Staff:  Assistant: Ayla Luther CFA  Circulator: Jeana Smith R.N.  Scrub Person: Sundar Quintanilla    Specimens removed if any:  * No specimens in log *    Estimated Blood Loss: minimal     Findings: right knee lateral and medial meniscus tear, engaging plica and synovitis     Complications: no apparent         6/27/2023 3:48 PM Benigno Porras M.D.

## 2023-06-27 NOTE — OR NURSING
1617: Patient arrived to phase II from PACU 1 via gurney. Report received from RN. Respirations are spontaneous and unlabored. VSS on RA. Dressing is CDI. RLE: pedal pulse is 2+, cap refill less than 3 seconds, warm.    1629: Family at bedside.     1645: Patient education completed, family denies further questions. DC'd to care of family post uneventful stay in PACU 2.

## 2023-06-27 NOTE — ANESTHESIA TIME REPORT
Anesthesia Start and Stop Event Times     Date Time Event    6/27/2023 1436 Ready for Procedure     1443 Anesthesia Start     1536 Anesthesia Stop        Responsible Staff  06/27/23    Name Role Begin End    Jane Angel M.D. Anesth 1443 1536        Overtime Reason:  no overtime (within assigned shift)    Comments:

## 2023-06-27 NOTE — ANESTHESIA PREPROCEDURE EVALUATION
Case: [id number] Date/Time: 06/27/23 1410    Procedure: RIGHT KNEE ARTHROSCOPY, PARTIAL LATERAL MENISCECTOMY REPAIRS AS INDICATED    Pre-op diagnosis: TEAR OF LATERAL MENISCUS OF KNEE    Location: [facility] OR 05 / SURGERY [facility]    Surgeons: Benigno Porras M.D.        72 yo w/right knee pain and possible lateral meniscus tear    Relevant Problems   ANESTHESIA   (positive) Delayed emergence from anesthesia   (positive) PONV (postoperative nausea and vomiting)      GI   (positive) GERD (gastroesophageal reflux disease)       Physical Exam    Airway   Mallampati: II  TM distance: >3 FB  Neck ROM: full       Cardiovascular   Rhythm: regular  Rate: normal  (-) murmur     Dental - normal exam           Pulmonary   Breath sounds clear to auscultation     Abdominal    Neurological - normal exam                 Anesthesia Plan    ASA 2       Plan - general       Airway plan will be LMA          Induction: intravenous    Postoperative Plan: Postoperative administration of opioids is intended.    Pertinent diagnostic labs and testing reviewed    Informed Consent:    Anesthetic plan and risks discussed with patient.

## 2023-06-27 NOTE — OR NURSING
1534: Patient arrived to PACU from OR via [facility]. Report received from anesthesia and RN. Respirations are spontaneous and unlabored. VSS on 6L. Dressing is clean, dry and intact. Cold pack applied. RLE elevated. RLE; pedal pulse is 2+, cap refill less than 3 seconds, warm pink.     1545: Patient denies pain or nausea.    1552:  updated at this time.    1600: Pain remains at a tolerable level and pt denies nausea. Dressing CDI.    1615: Recovery completed at this time. Patient meets criteria for transfer to stage 2. Report given to Kvng MYERS.    1617: CNA at bedside to transfer patient to stage 2.

## 2023-06-27 NOTE — OR NURSING
1330: Patient allergies and NPO status verified, home medication reconciliation completed and belongings secured. Surgical site verified with patient. Patient verbalizes understanding of pain scale, expected course of stay and plan of care; patient and family state verbal understanding at this time. IV access established.

## 2023-06-28 NOTE — OP REPORT
DATE OF SERVICE:  06/27/2023     PREOPERATIVE DIAGNOSIS:  Right knee lateral meniscus tear.     POSTOPERATIVE DIAGNOSES:  Right knee lateral meniscus tear, medial meniscus   tear, synovitis with an engaging plica.     PROCEDURES:  1.  Right knee arthroscopy with medial and lateral meniscectomy.  2.  Right knee arthroscopy with limited synovectomy, removal of anterior   interval fat pad and engaging plica.     SURGEON:  Benigno Porras MD     ASSISTANT:  Ayla Luther, certified first assist.     ANESTHESIA:  General plus local.     ANESTHESIOLOGIST:  Jane Angel MD     ESTIMATED BLOOD LOSS:  Minimal.     COMPLICATIONS:  No apparent.     DISPOSITION:  PACU.     CONDITION:  Stable.     INDICATIONS:  The patient is an active 73-year-old female with ongoing knee   pain, failed conservative management with injections, physical therapy.  MRI   showed a lateral meniscus tear.  We discussed risks, benefits, rationale, and   alternatives to treatment.  Risks including but not limited to infection,   neurovascular injury, incomplete relief of symptoms, DVT, PE, cardiac arrest,   complications of anesthesia.  Informed consent was signed and placed on the   chart.  All of her questions were answered.  No guarantees implied or given.     TECHNIQUE:  The patient and I both agreed to the correct operative extremity.    Right leg was signed and marked in preoperative holding.  She was given 2 g   IV Ancef prophylaxis.  She was taken to the operative suite.  After adequate   anesthesia, time-out was taken by all present to identify the correct patient   and procedure.     Examination under anesthesia showed good range of motion, mild crepitus.    Stable ligaments. Good hip range of motion.  Tourniquet was placed, but not   inflated.  Right leg was sterilely prepped and draped in standard fashion.    Standard arthroscopic portals were established.     Findings: Posterior horn lateral meniscus tear, mid body lateral  meniscus   tear.  ACL and PCL in good condition.  Medial meniscus leading edge and   posterior horn tear engaging plica superiorly, restricting the suprapatellar   volume and significant synovitis in the fat pad, cartilage of the   patellofemoral joint, medial and lateral space was in good condition.     Arthroscopic biter and shaver were used to perform a partial lateral   meniscectomy and a partial medial meniscectomy and contour the meniscus back   to stable edges. Both the anterior horn and posterior horn roots were   attached. The posterior medial and posterolateral compartments were clear.    Anterior fat pad interval release was carried out followed by removal of   engaging suprapatellar plica.  All loose debris was taken off the knee.    Hemostasis was observed.  Instruments and fluid removed.  Portals were closed   with simple nylon.  Marcaine 0.5% plain was injected for pain control.    Xeroform soft dressing was applied.  The patient was transferred to recovery   in stable condition.  Counts were correct.  No apparent complications.     In recovery, able to dorsiflex, plantar flex, intact to light sensory touch.     Ayla Luther, certified first assist, was essential for successful   completion of the case.  She assisted with positioning before and throughout   the case, and management of arthroscopic instruments and the case could not be   done without her.        ______________________________  MD LUKE Murcia//LUL    DD:  06/27/2023 15:53  DT:  06/27/2023 17:58    Job#:  [id number]

## 2023-06-28 NOTE — ANESTHESIA POSTPROCEDURE EVALUATION
Patient: Chrissy Willoughby    Procedure Summary     Date: 06/27/23 Room / Location:  OR  / SURGERY [facility]    Anesthesia Start: 1443 Anesthesia Stop: 1536    Procedure: RIGHT KNEE ARTHROSCOPY, PARTIAL MEDIAL PARTIAL LATERAL MENISCECTOMY AND LIMITED SiNLVECTOMY (Right: Knee) Diagnosis: (TEAR OF LATERAL MENISCUS OF KNEE)    Surgeons: Benigno Porras M.D. Responsible Provider: Jane Angel M.D.    Anesthesia Type: general ASA Status: 2          Final Anesthesia Type: general  Last vitals  BP   Blood Pressure : (!) 150/54    Temp   36.6 °C (97.9 °F)    Pulse   65   Resp   16    SpO2   94 %      Anesthesia Post Evaluation    Patient location during evaluation: PACU  Patient participation: complete - patient participated  Level of consciousness: awake  Pain score: 4    Airway patency: patent  Anesthetic complications: no  Cardiovascular status: hemodynamically stable  Respiratory status: acceptable  Hydration status: acceptable    PONV: none          No notable events documented.     Nurse Pain Score: 4 (NPRS)

## 2023-09-21 ENCOUNTER — APPOINTMENT (RX ONLY)
Dept: URBAN - METROPOLITAN AREA CLINIC 15 | Facility: CLINIC | Age: 73
Setting detail: DERMATOLOGY
End: 2023-09-21

## 2023-09-21 DIAGNOSIS — L82.0 INFLAMED SEBORRHEIC KERATOSIS: ICD-10-CM

## 2023-09-21 DIAGNOSIS — D18.0 HEMANGIOMA: ICD-10-CM

## 2023-09-21 DIAGNOSIS — L60.8 OTHER NAIL DISORDERS: ICD-10-CM

## 2023-09-21 DIAGNOSIS — D22 MELANOCYTIC NEVI: ICD-10-CM

## 2023-09-21 DIAGNOSIS — L82.1 OTHER SEBORRHEIC KERATOSIS: ICD-10-CM

## 2023-09-21 DIAGNOSIS — L81.4 OTHER MELANIN HYPERPIGMENTATION: ICD-10-CM

## 2023-09-21 DIAGNOSIS — L57.0 ACTINIC KERATOSIS: ICD-10-CM

## 2023-09-21 PROBLEM — D22.5 MELANOCYTIC NEVI OF TRUNK: Status: ACTIVE | Noted: 2023-09-21

## 2023-09-21 PROBLEM — D48.5 NEOPLASM OF UNCERTAIN BEHAVIOR OF SKIN: Status: ACTIVE | Noted: 2023-09-21

## 2023-09-21 PROBLEM — D18.01 HEMANGIOMA OF SKIN AND SUBCUTANEOUS TISSUE: Status: ACTIVE | Noted: 2023-09-21

## 2023-09-21 PROBLEM — D22.61 MELANOCYTIC NEVI OF RIGHT UPPER LIMB, INCLUDING SHOULDER: Status: ACTIVE | Noted: 2023-09-21

## 2023-09-21 PROBLEM — D22.62 MELANOCYTIC NEVI OF LEFT UPPER LIMB, INCLUDING SHOULDER: Status: ACTIVE | Noted: 2023-09-21

## 2023-09-21 PROCEDURE — ? BIOPSY BY SHAVE METHOD

## 2023-09-21 PROCEDURE — ? COUNSELING

## 2023-09-21 PROCEDURE — ? DEFER

## 2023-09-21 PROCEDURE — 99213 OFFICE O/P EST LOW 20 MIN: CPT | Mod: 25

## 2023-09-21 PROCEDURE — ? PRESCRIPTION

## 2023-09-21 PROCEDURE — 11102 TANGNTL BX SKIN SINGLE LES: CPT

## 2023-09-21 RX ORDER — FLUOROURACIL 5 MG/G
CREAM TOPICAL QD
Qty: 40 | Refills: 6 | Status: ERX | COMMUNITY
Start: 2023-09-21

## 2023-09-21 RX ADMIN — FLUOROURACIL: 5 CREAM TOPICAL at 00:00

## 2023-09-21 ASSESSMENT — LOCATION SIMPLE DESCRIPTION DERM
LOCATION SIMPLE: RIGHT UPPER ARM
LOCATION SIMPLE: ABDOMEN
LOCATION SIMPLE: LEFT UPPER ARM
LOCATION SIMPLE: CHEST
LOCATION SIMPLE: LEFT GREAT TOE
LOCATION SIMPLE: RIGHT FOREARM
LOCATION SIMPLE: INFERIOR FOREHEAD
LOCATION SIMPLE: LEFT FOREARM
LOCATION SIMPLE: RIGHT GREAT TOE
LOCATION SIMPLE: LOWER BACK
LOCATION SIMPLE: RIGHT UPPER BACK
LOCATION SIMPLE: RIGHT FOREHEAD

## 2023-09-21 ASSESSMENT — LOCATION DETAILED DESCRIPTION DERM
LOCATION DETAILED: LEFT GREAT TOENAIL
LOCATION DETAILED: SUPERIOR LUMBAR SPINE
LOCATION DETAILED: RIGHT MEDIAL UPPER BACK
LOCATION DETAILED: LEFT ANTECUBITAL SKIN
LOCATION DETAILED: RIGHT INFERIOR MEDIAL UPPER BACK
LOCATION DETAILED: LEFT PROXIMAL DORSAL FOREARM
LOCATION DETAILED: SUBXIPHOID
LOCATION DETAILED: LEFT VENTRAL DISTAL FOREARM
LOCATION DETAILED: RIGHT MEDIAL FOREHEAD
LOCATION DETAILED: PERIUMBILICAL SKIN
LOCATION DETAILED: INFERIOR MID FOREHEAD
LOCATION DETAILED: RIGHT GREAT TOENAIL
LOCATION DETAILED: RIGHT INFERIOR FOREHEAD
LOCATION DETAILED: RIGHT INFERIOR UPPER BACK
LOCATION DETAILED: RIGHT ANTECUBITAL SKIN
LOCATION DETAILED: LEFT VENTRAL PROXIMAL FOREARM
LOCATION DETAILED: RIGHT VENTRAL PROXIMAL FOREARM
LOCATION DETAILED: LOWER STERNUM
LOCATION DETAILED: RIGHT ANTERIOR DISTAL UPPER ARM

## 2023-09-21 ASSESSMENT — LOCATION ZONE DERM
LOCATION ZONE: TRUNK
LOCATION ZONE: TOENAIL
LOCATION ZONE: FACE
LOCATION ZONE: ARM

## 2023-09-21 NOTE — PROCEDURE: DEFER
Detail Level: Zone
Size Of Lesion In Cm (Optional): 0
Introduction Text (Please End With A Colon): Discussed tx options and will wait until next appt.  Discussed palliative tx with pt's family.

## 2023-09-21 NOTE — PROCEDURE: MIPS QUALITY
Detail Level: Detailed
Quality 130: Documentation Of Current Medications In The Medical Record: Current Medications Documented
Quality 111:Pneumonia Vaccination Status For Older Adults: Patient received any pneumococcal conjugate or polysaccharide vaccine on or after their 60th birthday and before the end of the measurement period
Quality 431: Preventive Care And Screening: Unhealthy Alcohol Use - Screening: Patient not identified as an unhealthy alcohol user when screened for unhealthy alcohol use using a systematic screening method
Quality 110: Preventive Care And Screening: Influenza Immunization: Influenza Immunization Administered during Influenza season
Quality 226: Preventive Care And Screening: Tobacco Use: Screening And Cessation Intervention: Patient screened for tobacco use and is an ex/non-smoker

## 2023-09-21 NOTE — PROCEDURE: BIOPSY BY SHAVE METHOD
Detail Level: Detailed
Depth Of Biopsy: dermis
Was A Bandage Applied: Yes
Size Of Lesion In Cm: 2
X Size Of Lesion In Cm: 2.2
Biopsy Type: H and E
Biopsy Method: Personna blade
Anesthesia Type: 1% lidocaine without epinephrine and a 1:10 solution of 8.4% sodium bicarbonate
Anesthesia Volume In Cc: 1
Additional Anesthesia Volume In Cc (Will Not Render If 0): 0
Hemostasis: Electrocautery
Wound Care: Vaseline
Dressing: Band-Aid
Destruction After The Procedure: No
Type Of Destruction Used: Electrodesiccation
Curettage Text: The wound bed was treated with curettage after the biopsy was performed.
Cryotherapy Text: The wound bed was treated with cryotherapy after the biopsy was performed.
Electrodesiccation Text: The wound bed was treated with electrodesiccation after the biopsy was performed.
Electrodesiccation And Curettage Text: The wound bed was treated with electrodesiccation and curettage after the biopsy was performed.
Silver Nitrate Text: The wound bed was treated with silver nitrate after the biopsy was performed.
Lab: [id number]
Lab Facility: 
Consent: Written consent was obtained and risks were reviewed including but not limited to scarring, infection, bleeding, scabbing, incomplete removal, nerve damage and allergy to anesthesia.
Post-Care Instructions: I reviewed with the patient in detail post-care instructions. Patient is to keep the biopsy site dry overnight, and then apply bacitracin/petroleum  twice daily until healed. Patient may apply hydrogen peroxide soaks to remove any crusting.
Notification Instructions: Patient will be notified of biopsy results. However, patient instructed to call the office if not contacted within 2 weeks.
Billing Type: Third-Party Bill
Information: Selecting Yes will display possible errors in your note based on the variables you have selected. This validation is only offered as a suggestion for you. PLEASE NOTE THAT THE VALIDATION TEXT WILL BE REMOVED WHEN YOU FINALIZE YOUR NOTE. IF YOU WANT TO FAX A PRELIMINARY NOTE YOU WILL NEED TO TOGGLE THIS TO 'NO' IF YOU DO NOT WANT IT IN YOUR FAXED NOTE.

## 2023-09-29 ENCOUNTER — APPOINTMENT (RX ONLY)
Dept: URBAN - METROPOLITAN AREA CLINIC 15 | Facility: CLINIC | Age: 73
Setting detail: DERMATOLOGY
End: 2023-09-29

## 2023-09-29 DIAGNOSIS — L57.0 ACTINIC KERATOSIS: ICD-10-CM

## 2023-09-29 PROCEDURE — ? DIAGNOSIS COMMENT

## 2023-09-29 PROCEDURE — 17000 DESTRUCT PREMALG LESION: CPT

## 2023-09-29 PROCEDURE — 17003 DESTRUCT PREMALG LES 2-14: CPT

## 2023-09-29 PROCEDURE — ? LIQUID NITROGEN

## 2023-09-29 ASSESSMENT — LOCATION ZONE DERM
LOCATION ZONE: LIP
LOCATION ZONE: FACE
LOCATION ZONE: ARM

## 2023-09-29 ASSESSMENT — LOCATION SIMPLE DESCRIPTION DERM
LOCATION SIMPLE: RIGHT LIP
LOCATION SIMPLE: LEFT CHEEK
LOCATION SIMPLE: LEFT FOREARM
LOCATION SIMPLE: LEFT ZYGOMA

## 2023-09-29 ASSESSMENT — LOCATION DETAILED DESCRIPTION DERM
LOCATION DETAILED: LEFT PROXIMAL DORSAL FOREARM
LOCATION DETAILED: LEFT CENTRAL ZYGOMA
LOCATION DETAILED: RIGHT INFERIOR VERMILION LIP
LOCATION DETAILED: LEFT MEDIAL MALAR CHEEK

## 2023-09-29 NOTE — PROCEDURE: DIAGNOSIS COMMENT
Detail Level: Simple
Render Risk Assessment In Note?: no
Comment: [id number] pigmented Ak. Treated with LN2 at todays visit

## 2023-10-18 NOTE — HPI: FULL BODY SKIN EXAMINATION
How Severe Are Your Spot(S)?: moderate
What Type Of Note Output Would You Prefer (Optional)?: Bullet Format
How Severe Is Your Skin Lesion?: moderate
What Type Of Note Output Would You Prefer (Optional)?: Standard Output
Has Your Skin Lesion Been Treated?: not been treated
What Is The Reason For Today's Visit?: Full Body Skin Examination
Is This A New Presentation, Or A Follow-Up?: Skin Lesion
What Is The Reason For Today's Visit? (Being Monitored For X): concerning skin lesions on an annual basis

## 2023-11-27 DIAGNOSIS — E78.2 MIXED HYPERLIPIDEMIA: ICD-10-CM

## 2023-11-28 RX ORDER — EZETIMIBE 10 MG/1
10 TABLET ORAL
Qty: 100 TABLET | Refills: 3 | Status: SHIPPED | OUTPATIENT
Start: 2023-11-28

## 2023-11-28 NOTE — TELEPHONE ENCOUNTER
Have we ever prescribed this med? Yes.  If yes, what date? 11/17/22    Last OV: 06/05/23 with Kathy Crowlye Pa-C    Next OV: No Pending appt.     DX:  Mixed hyperlipidemia [E78.2]     Medications:   Requested Prescriptions     Pending Prescriptions Disp Refills    ezetimibe (ZETIA) 10 MG Tab [Pharmacy Med Name: Ezetimibe 10 Mg Tab Nort] 90 Tablet 0     Sig: Take 1 Tablet by mouth every day.

## 2023-11-30 ENCOUNTER — APPOINTMENT (RX ONLY)
Dept: URBAN - METROPOLITAN AREA CLINIC 15 | Facility: CLINIC | Age: 73
Setting detail: DERMATOLOGY
End: 2023-11-30

## 2023-11-30 DIAGNOSIS — L57.0 ACTINIC KERATOSIS: ICD-10-CM

## 2023-11-30 PROCEDURE — 17000 DESTRUCT PREMALG LESION: CPT

## 2023-11-30 PROCEDURE — ? ADDITIONAL NOTES

## 2023-11-30 PROCEDURE — ? LIQUID NITROGEN

## 2023-11-30 PROCEDURE — ? COUNSELING

## 2023-11-30 ASSESSMENT — LOCATION DETAILED DESCRIPTION DERM
LOCATION DETAILED: NASAL DORSUM
LOCATION DETAILED: RIGHT LOWER CUTANEOUS LIP

## 2023-11-30 ASSESSMENT — LOCATION SIMPLE DESCRIPTION DERM
LOCATION SIMPLE: NOSE
LOCATION SIMPLE: RIGHT LIP

## 2023-11-30 ASSESSMENT — LOCATION ZONE DERM
LOCATION ZONE: NOSE
LOCATION ZONE: LIP

## 2023-11-30 NOTE — PROCEDURE: ADDITIONAL NOTES
Additional Notes: Recommended to apply chemo cream when feels spots come up.
Render Risk Assessment In Note?: no
Detail Level: Detailed

## 2024-04-01 ENCOUNTER — APPOINTMENT (OUTPATIENT)
Dept: RADIOLOGY | Facility: MEDICAL CENTER | Age: 74
End: 2024-04-01
Attending: PHYSICAL MEDICINE & REHABILITATION
Payer: MEDICARE

## 2024-04-01 DIAGNOSIS — M54.9 DORSALGIA: ICD-10-CM

## 2024-04-01 DIAGNOSIS — M54.50 LOW BACK PAIN, UNSPECIFIED BACK PAIN LATERALITY, UNSPECIFIED CHRONICITY, UNSPECIFIED WHETHER SCIATICA PRESENT: ICD-10-CM

## 2024-04-01 DIAGNOSIS — M47.814 SPONDYLOSIS WITHOUT MYELOPATHY OR RADICULOPATHY, THORACIC REGION: ICD-10-CM

## 2024-04-01 PROCEDURE — 72146 MRI CHEST SPINE W/O DYE: CPT

## 2024-04-03 SDOH — ECONOMIC STABILITY: INCOME INSECURITY: IN THE LAST 12 MONTHS, WAS THERE A TIME WHEN YOU WERE NOT ABLE TO PAY THE MORTGAGE OR RENT ON TIME?: NO

## 2024-04-03 SDOH — HEALTH STABILITY: MENTAL HEALTH
STRESS IS WHEN SOMEONE FEELS TENSE, NERVOUS, ANXIOUS, OR CAN'T SLEEP AT NIGHT BECAUSE THEIR MIND IS TROUBLED. HOW STRESSED ARE YOU?: RATHER MUCH

## 2024-04-03 SDOH — ECONOMIC STABILITY: FOOD INSECURITY: WITHIN THE PAST 12 MONTHS, THE FOOD YOU BOUGHT JUST DIDN'T LAST AND YOU DIDN'T HAVE MONEY TO GET MORE.: NEVER TRUE

## 2024-04-03 SDOH — ECONOMIC STABILITY: INCOME INSECURITY: HOW HARD IS IT FOR YOU TO PAY FOR THE VERY BASICS LIKE FOOD, HOUSING, MEDICAL CARE, AND HEATING?: SOMEWHAT HARD

## 2024-04-03 SDOH — ECONOMIC STABILITY: HOUSING INSECURITY: IN THE LAST 12 MONTHS, HOW MANY PLACES HAVE YOU LIVED?: 1

## 2024-04-03 SDOH — HEALTH STABILITY: PHYSICAL HEALTH: ON AVERAGE, HOW MANY DAYS PER WEEK DO YOU ENGAGE IN MODERATE TO STRENUOUS EXERCISE (LIKE A BRISK WALK)?: 0 DAYS

## 2024-04-03 SDOH — ECONOMIC STABILITY: FOOD INSECURITY: WITHIN THE PAST 12 MONTHS, YOU WORRIED THAT YOUR FOOD WOULD RUN OUT BEFORE YOU GOT MONEY TO BUY MORE.: NEVER TRUE

## 2024-04-03 SDOH — HEALTH STABILITY: PHYSICAL HEALTH: ON AVERAGE, HOW MANY MINUTES DO YOU ENGAGE IN EXERCISE AT THIS LEVEL?: 0 MIN

## 2024-04-03 ASSESSMENT — SOCIAL DETERMINANTS OF HEALTH (SDOH)
HOW OFTEN DO YOU GET TOGETHER WITH FRIENDS OR RELATIVES?: ONCE A WEEK
HOW OFTEN DO YOU ATTENT MEETINGS OF THE CLUB OR ORGANIZATION YOU BELONG TO?: NEVER
IN A TYPICAL WEEK, HOW MANY TIMES DO YOU TALK ON THE PHONE WITH FAMILY, FRIENDS, OR NEIGHBORS?: THREE TIMES A WEEK
HOW OFTEN DO YOU HAVE SIX OR MORE DRINKS ON ONE OCCASION: NEVER
HOW HARD IS IT FOR YOU TO PAY FOR THE VERY BASICS LIKE FOOD, HOUSING, MEDICAL CARE, AND HEATING?: SOMEWHAT HARD
HOW OFTEN DO YOU ATTEND CHURCH OR RELIGIOUS SERVICES?: NEVER
IN A TYPICAL WEEK, HOW MANY TIMES DO YOU TALK ON THE PHONE WITH FAMILY, FRIENDS, OR NEIGHBORS?: THREE TIMES A WEEK
WITHIN THE PAST 12 MONTHS, YOU WORRIED THAT YOUR FOOD WOULD RUN OUT BEFORE YOU GOT THE MONEY TO BUY MORE: NEVER TRUE
DO YOU BELONG TO ANY CLUBS OR ORGANIZATIONS SUCH AS CHURCH GROUPS UNIONS, FRATERNAL OR ATHLETIC GROUPS, OR SCHOOL GROUPS?: NO
HOW OFTEN DO YOU GET TOGETHER WITH FRIENDS OR RELATIVES?: ONCE A WEEK
DO YOU BELONG TO ANY CLUBS OR ORGANIZATIONS SUCH AS CHURCH GROUPS UNIONS, FRATERNAL OR ATHLETIC GROUPS, OR SCHOOL GROUPS?: NO
HOW OFTEN DO YOU ATTEND CHURCH OR RELIGIOUS SERVICES?: NEVER
HOW OFTEN DO YOU ATTENT MEETINGS OF THE CLUB OR ORGANIZATION YOU BELONG TO?: NEVER
HOW OFTEN DO YOU HAVE A DRINK CONTAINING ALCOHOL: 2-4 TIMES A MONTH
HOW MANY DRINKS CONTAINING ALCOHOL DO YOU HAVE ON A TYPICAL DAY WHEN YOU ARE DRINKING: 1 OR 2

## 2024-04-03 ASSESSMENT — LIFESTYLE VARIABLES
HOW OFTEN DO YOU HAVE SIX OR MORE DRINKS ON ONE OCCASION: NEVER
HOW OFTEN DO YOU HAVE A DRINK CONTAINING ALCOHOL: 2-4 TIMES A MONTH
HOW MANY STANDARD DRINKS CONTAINING ALCOHOL DO YOU HAVE ON A TYPICAL DAY: 1 OR 2
SKIP TO QUESTIONS 9-10: 1
AUDIT-C TOTAL SCORE: 2

## 2024-04-04 ENCOUNTER — APPOINTMENT (OUTPATIENT)
Dept: MEDICAL GROUP | Facility: MEDICAL CENTER | Age: 74
End: 2024-04-04
Payer: MEDICARE

## 2024-04-04 VITALS
DIASTOLIC BLOOD PRESSURE: 68 MMHG | WEIGHT: 186.7 LBS | HEIGHT: 68 IN | OXYGEN SATURATION: 94 % | SYSTOLIC BLOOD PRESSURE: 116 MMHG | BODY MASS INDEX: 28.3 KG/M2 | HEART RATE: 70 BPM | TEMPERATURE: 97.2 F

## 2024-04-04 DIAGNOSIS — R53.83 OTHER FATIGUE: ICD-10-CM

## 2024-04-04 DIAGNOSIS — Z00.00 ENCOUNTER FOR ANNUAL WELLNESS EXAM IN MEDICARE PATIENT: ICD-10-CM

## 2024-04-04 DIAGNOSIS — R73.09 ELEVATED GLUCOSE: ICD-10-CM

## 2024-04-04 DIAGNOSIS — E78.2 MIXED HYPERLIPIDEMIA: ICD-10-CM

## 2024-04-04 DIAGNOSIS — F33.42 RECURRENT MAJOR DEPRESSIVE DISORDER, IN FULL REMISSION (HCC): ICD-10-CM

## 2024-04-04 DIAGNOSIS — S83.206A TEAR OF MENISCUS OF RIGHT KNEE AS CURRENT INJURY, UNSPECIFIED MENISCUS, UNSPECIFIED TEAR TYPE, INITIAL ENCOUNTER: ICD-10-CM

## 2024-04-04 DIAGNOSIS — N95.1 MENOPAUSAL SYMPTOMS: ICD-10-CM

## 2024-04-04 PROCEDURE — 3074F SYST BP LT 130 MM HG: CPT | Performed by: PHYSICIAN ASSISTANT

## 2024-04-04 PROCEDURE — 3078F DIAST BP <80 MM HG: CPT | Performed by: PHYSICIAN ASSISTANT

## 2024-04-04 PROCEDURE — G0439 PPPS, SUBSEQ VISIT: HCPCS | Performed by: PHYSICIAN ASSISTANT

## 2024-04-04 RX ORDER — GABAPENTIN 300 MG/1
300 CAPSULE ORAL 2 TIMES DAILY PRN
COMMUNITY
Start: 2024-03-21

## 2024-04-04 ASSESSMENT — ENCOUNTER SYMPTOMS: GENERAL WELL-BEING: GOOD

## 2024-04-04 ASSESSMENT — FIBROSIS 4 INDEX: FIB4 SCORE: 1.1

## 2024-04-04 ASSESSMENT — PATIENT HEALTH QUESTIONNAIRE - PHQ9
SUM OF ALL RESPONSES TO PHQ QUESTIONS 1-9: 3
5. POOR APPETITE OR OVEREATING: 0 - NOT AT ALL
CLINICAL INTERPRETATION OF PHQ2 SCORE: 1

## 2024-04-04 ASSESSMENT — ACTIVITIES OF DAILY LIVING (ADL): BATHING_REQUIRES_ASSISTANCE: 0

## 2024-04-04 NOTE — PROGRESS NOTES
Chief Complaint   Patient presents with    Annual Exam     awv    Ankle Swelling     Ankles have been swelling for a while       HPI:  Chrissy Willoughby is a 73 y.o. here for Medicare Annual Wellness Visit     Patient Active Problem List    Diagnosis Date Noted    PONV (postoperative nausea and vomiting) 06/27/2023    Delayed emergence from anesthesia 06/27/2023    Tear of meniscus of right knee as current injury 06/05/2023    Chronic bilateral low back pain without sciatica 06/05/2023    Recurrent major depressive disorder, in full remission (HCC) 08/16/2022    Menopausal symptoms 08/16/2022    Diverticulosis 08/16/2022    Encounter for annual wellness exam in Medicare patient 08/16/2022    Trigger thumb, right thumb 06/30/2021    Depression 06/14/2018    GERD (gastroesophageal reflux disease) 06/14/2018    Mixed hyperlipidemia 06/14/2018    Other localized visual field defect 07/29/2015    Tendonitis 06/06/2013       Current Outpatient Medications   Medication Sig Dispense Refill    ezetimibe (ZETIA) 10 MG Tab Take 1 Tablet by mouth every day. 100 Tablet 3    Multiple Vitamins-Minerals (MULTIVITAMIN ADULT) Chew Tab Chew every day.      gabapentin (NEURONTIN) 100 MG Cap Take 100 mg by mouth 3 times a day.      ZENPEP 17045-853940 units Cap DR Particles Take  by mouth every day. Enzymes-lipase, amylase      sertraline (ZOLOFT) 100 MG Tab Take 1 Tablet by mouth every day. 90 Tablet 3    estradiol (ESTRACE) 0.5 MG tablet Take 1 Tablet by mouth every day. 30 Tablet 2    omeprazole (PRILOSEC) 20 MG delayed-release capsule Take 20 mg by mouth as needed.      vitamin D (CHOLECALCIFEROL) 1000 UNIT Tab Take 1 Tablet by mouth every day.       No current facility-administered medications for this visit.          Current supplements as per medication list.     Allergies: Codeine    Current social contact/activities:      She  reports that she has never smoked. She has never used smokeless tobacco. She reports that she does  not currently use alcohol. She reports that she does not use drugs.  Counseling given: Not Answered      ROS:    Gait: Uses no assistive device  Ostomy: No  Other tubes: No  Amputations: No  Chronic oxygen use: No  Last eye exam: 2021. Patient had recent cataract surgery.    Wears hearing aids: yes. occasionally  : Denies any urinary leakage during the last 6 months    Screening:    Depression Screening  Little interest or pleasure in doing things?  0 - not at all  Feeling down, depressed , or hopeless? 1 - several days  Trouble falling or staying asleep, or sleeping too much?  1 - several days  Feeling tired or having little energy?  1 - several days  Poor appetite or overeating?  0 - not at all  Feeling bad about yourself - or that you are a failure or have let yourself or your family down? 0 - not at all  Trouble concentrating on things, such as reading the newspaper or watching television? 0 - not at all  Moving or speaking so slowly that other people could have noticed.  Or the opposite - being so fidgety or restless that you have been moving around a lot more than usual?  0 - not at all  Thoughts that you would be better off dead, or of hurting yourself?  0 - not at all  Patient Health Questionnaire Score: 3    If depressive symptoms identified deferred to follow up visit unless specifically addressed in assessment and plan.    Interpretation of PHQ-9 Total Score   Score Severity   1-4 No Depression   5-9 Mild Depression   10-14 Moderate Depression   15-19 Moderately Severe Depression   20-27 Severe Depression    Screening for Cognitive Impairment  Do you or any of your friends or family members have any concern about your memory? Yes  Three Minute Recall (Leader, Season, Table) 3/3    Bhraat clock face with all 12 numbers and set the hands to show 10 minutes after 11.  Yes    Cognitive concerns identified deferred for follow up unless specifically addressed in assessment and plan.    Fall Risk Assessment  Has  the patient had two or more falls in the last year or any fall with injury in the last year?  No    Safety Assessment  Do you always wear your seatbelt?  Yes  Any changes to home needed to function safely? No  Difficulty hearing.  No  Patient counseled about all safety risks that were identified.    Functional Assessment ADLs  Are there any barriers preventing you from cooking for yourself or meeting nutritional needs?  No.    Are there any barriers preventing you from driving safely or obtaining transportation?  No.    Are there any barriers preventing you from using a telephone or calling for help?  No    Are there any barriers preventing you from shopping?  No.    Are there any barriers preventing you from taking care of your own finances?  No    Are there any barriers preventing you from managing your medications?  No    Are there any barriers preventing you from showering, bathing or dressing yourself? No    Are there any barriers preventing you from doing housework or laundry? No    Are there any barriers preventing you from using the toilet?No    Are you currently engaging in any exercise or physical activity?  No.      Self-Assessment of Health  What is your perception of your health? Good    Do you sleep more than six hours a night? Yes    In the past 7 days, how much did pain keep you from doing your normal work? Some    Do you spend quality time with family or friends (virtually or in person)? Yes    Do you usually eat a heart healthy diet that constists of a variety of fruits, vegetables, whole grains and fiber? Yes    Do you eat foods high in fat and/or Fast Food more than three times per week? No    How concerned are you that your medical conditions are not being well managed? Not at all    Are you worried that in the next 2 months, you may not have stable housing that you own, rent, or stay in as part of a household? No        Advance Care Planning  Do you have an Advance Directive, Living Will,  Durable Power of , or POLST? Yes  Advance Directive       is on file      Health Maintenance Summary            Overdue - Annual Wellness Visit (Yearly) Overdue since 11/17/2023 11/17/2022  Level of Service: ME PREVENTIVE VISIT,EST,65 & OVER    08/16/2022  Level of Service: ME PREVENTIVE VISIT,NEW,65 & OVER              Colorectal Cancer Screening (Colonoscopy - Preferred) Next due on 8/2/2024      No completion history exists for this topic.              Bone Density Scan (Every 5 Years) Tentatively due on 8/20/2024 08/20/2019  DS-BONE DENSITY STUDY (DEXA)    11/01/2011  DS-BONE DENSITY STUDY (DEXA)    10/07/2008  DS-BONE DENSITY STUDY (DEXA)              IMM DTaP/Tdap/Td Vaccine (2 - Td or Tdap) Next due on 8/16/2029 08/16/2019  Imm Admin: Tdap Vaccine              Pneumococcal Vaccine: 65+ Years (Series Information) Completed      08/16/2019  Imm Admin: Pneumococcal polysaccharide vaccine (PPSV-23)    03/31/2018  Imm Admin: Pneumococcal Conjugate Vaccine (Prevnar/PCV-13)    09/17/2015  Imm Admin: Pneumococcal polysaccharide vaccine (PPSV-23)              Zoster (Shingles) Vaccines (Series Information) Completed      03/24/2021  Imm Admin: Zoster Vaccine Recombinant (RZV) (SHINGRIX)    10/13/2020  Imm Admin: Zoster Vaccine Recombinant (RZV) (SHINGRIX)    09/01/2012  Imm Admin: Zoster Vaccine Live (ZVL) (Zostavax) - HISTORICAL DATA              Hepatitis C Screening  Tentatively Complete      11/21/2022  Hepatitis C Antibody component of HEP C VIRUS ANTIBODY              Hepatitis A Vaccine (Hep A) (Series Information) Aged Out      09/01/2023  Outside Immunization: Hep A, adult              Influenza Vaccine (Series Information) Completed      09/01/2023  Outside Immunization: Fluzone High-Dose Quad    09/14/2022  Imm Admin: Influenza Vaccine Adult HD    09/15/2021  Imm Admin: Influenza Vaccine Adult HD    09/23/2020  Imm Admin: Influenza Vaccine Adult HD    10/01/2019  Imm Admin: Influenza  Vaccine Adult HD    Only the first 5 history entries have been loaded, but more history exists.              COVID-19 Vaccine (Series Information) Completed      10/02/2023  Imm Admin: Comirnaty (Covid-19 Vaccine, Mrna, 3616-1405 Formula)    09/14/2022  Imm Admin: PFIZER BIVALENT SARS-COV-2 VACCINE (12+)    05/17/2022  Imm Admin: MODERNA SARS-COV-2 VACCINE (12+)    10/27/2021  Imm Admin: MODERNA SARS-COV-2 VACCINE (12+)    02/06/2021  Imm Admin: MODERNA SARS-COV-2 VACCINE (12+)    Only the first 5 history entries have been loaded, but more history exists.              Hepatitis B Vaccine (Hep B) (Series Information) Aged Out      No completion history exists for this topic.              HPV Vaccines (Series Information) Aged Out      No completion history exists for this topic.              Polio Vaccine (Inactivated Polio) (Series Information) Aged Out      No completion history exists for this topic.              Meningococcal Immunization (Series Information) Aged Out      No completion history exists for this topic.              Discontinued - Mammogram  Discontinued      03/02/2018  MA-MAMMO SCREENING BILAT W/KIM W/CAD    02/07/2014  MA-SCREENING MAMMOGRAM W/ CAD    11/09/2012  MA-SCREENING MAMMOGRAM W/ CAD    11/01/2011  MA-SCREEING MAMMOGRAM W/ CAD    10/07/2008  MA-SCREENING DIGITAL MAMMO    Only the first 5 history entries have been loaded, but more history exists.                    Patient Care Team:  Kathy Crowley P.A.-C. as PCP - General (Family Medicine)  Adelina Angelo as Patient Advocate (Pharmacy)      Social History     Tobacco Use    Smoking status: Never    Smokeless tobacco: Never   Vaping Use    Vaping Use: Never used   Substance Use Topics    Alcohol use: Not Currently    Drug use: No     No family history on file.  She  has a past medical history of Anesthesia, Bronchitis (2013), Cataract, Heart burn, Hiatus hernia syndrome, High cholesterol, History of anemia, Indigestion, Other  specified disorder of intestines, Pain, PONV (postoperative nausea and vomiting), Psychiatric problem, and Renal disorder.   Past Surgical History:   Procedure Laterality Date    MENISCECTOMY, KNEE, ARTHROSCOPIC Right 6/27/2023    Procedure: RIGHT KNEE ARTHROSCOPY, PARTIAL MEDIAL PARTIAL LATERAL MENISCECTOMY AND LIMITED SiNLVECTOMY;  Surgeon: Benigno Porras M.D.;  Location: [facility];  Service: Orthopedics    SHOULDER ARTHROSCOPY Left 12/2021    CATARACT EXTRACTION WITH IOL Bilateral 04/2021    HIP ARTHROSCOPY Left 11/04/2019    Procedure: ARTHROSCOPY, HIP - W/DEBRIDEMENT, PSOAS RELEASE AND MCQUEEN;  Surgeon: Otis Lane M.D.;  Location: [facility];  Service: Orthopedics    SYNOVECTOMY Left 11/04/2019    Procedure: SYNOVECTOMY;  Surgeon: Otis Lane M.D.;  Location: [facility];  Service: Orthopedics    FEMORAL NECK OSTEOPLASTY Left 11/04/2019    Procedure: OSTEOPLASTY, FEMUR, NECK;  Surgeon: Otis Lane M.D.;  Location: [facility];  Service: Orthopedics    ACETABULAR OSTEOPLASTY Left 11/04/2019    Procedure: OSTEOPLASTY, ACETABULUM;  Surgeon: Otis Lane M.D.;  Location: [facility];  Service: Orthopedics    HIATAL HERNIA REPAIR  2018    PTOSIS REPAIR Bilateral 07/29/2015    Procedure: PTOSIS REPAIR-UPPER LID (POSTERIOR APPROACH);  Surgeon: Terence Nguyen M.D.;  Location: [facility];  Service:     TENSILON TEST  02/19/2015    Performed by Feliberto Pyle D.O. at ENDOSCOPY [facility] ORS    MODIFIED NIRSCHEL PROCEDURE  06/06/2013    Performed by Cate Alcaraz M.D. at SURGERY SAME DAY [facility]    SHOULDER DECOMPRESSION ARTHROSCOPIC  06/06/2013    Performed by Cate Alcaraz M.D. at SURGERY SAME DAY [facility]    ELBOW EXPLORATION Right 2013    CERVICAL DISK AND FUSION ANTERIOR  10/20/2009    Performed by CATE ALCARAZ at SURGERY [facility]    MENISCECTOMY Left 2009    left     ROTATOR  CUFF REPAIR  2005    left     LUMBAR FUSION ANTERIOR  2005    HYSTERECTOMY LAPAROSCOPY      OTHER ORTHOPEDIC SURGERY      lumbar fusion anterior       Exam:   Gen.: Patient is A and O ×3, no acute distress, well-nourished well-hydrated  Vitals: Are listed and unremarkable  HEENT: Heads normocephalic, atraumatic, PERRLA, extraocular movements intact, TMs and oropharynx clear  Neck: Soft supple without cervical lymphadenopathy  Cardiovascular: Regular rate and rhythm normal S1 and S2. No murmurs, rubs or gallops  Lungs are clear to auscultation bilaterally. no wheezes rales or rhonchi  Abdomen is soft, nontender, nondistended with good bowel sounds, no hepatosplenomegaly  Skin: Is well perfused without evidence of rash or lesions  Neurological:  cranial nerves II through XII were assessed and intact.  Musculoskeletal: Full range of motion, 5 out of 5 strength against resistance  Neurovascularly: Intact with a 2 second cap refill, good distal pulses    1. Recurrent major depressive disorder, in full remission (HCC)  Chronic and stable  Continue sertraline I have messed up because of     2. Menopausal symptoms  Chronic and stable  Patient sees Dr. Danielle Nicholson, gynecology, is status post hysterectomy  She takes estradiol 0.5 mg daily      3. Mixed hyperlipidemia  Chronic and stable  Has taken many statins and they were intolerable  Continue Zetia 10 mg daily     4. IBS  Chronic: unstable  Continue zenpep (amylase/lipase)     Ocular migraines  Chronic and stable  Continue migraine supplementation       Annual wellness exam:  As per above, anticipatory guidance discussed    Assessment and Plan. The following treatment and monitoring plan is recommended:        Services suggested: No services needed at this time  Health Care Screening: Age-appropriate preventive services recommended by USPTF and ACIP covered by Medicare were discussed today. Services ordered if indicated and agreed upon by the patient.  Referrals offered:  Community-based lifestyle interventions to reduce health risks and promote self-management and wellness, fall prevention, nutrition, physical activity, tobacco-use cessation, weight loss, and mental health services as per orders if indicated.    Discussion today about general wellness and lifestyle habits:    Prevent falls and reduce trip hazards; Cautioned about securing or removing rugs.  Have a working fire alarm and carbon monoxide detector;   Engage in regular physical activity and social activities     Follow-up: No follow-ups on file.

## 2024-04-08 ENCOUNTER — HOSPITAL ENCOUNTER (OUTPATIENT)
Dept: LAB | Facility: MEDICAL CENTER | Age: 74
End: 2024-04-08
Attending: PHYSICIAN ASSISTANT
Payer: MEDICARE

## 2024-04-08 DIAGNOSIS — R53.83 OTHER FATIGUE: ICD-10-CM

## 2024-04-08 DIAGNOSIS — R73.09 ELEVATED GLUCOSE: ICD-10-CM

## 2024-04-08 LAB
ALBUMIN SERPL BCP-MCNC: 4.4 G/DL (ref 3.2–4.9)
ALBUMIN/GLOB SERPL: 1.6 G/DL
ALP SERPL-CCNC: 48 U/L (ref 30–99)
ALT SERPL-CCNC: 15 U/L (ref 2–50)
ANION GAP SERPL CALC-SCNC: 13 MMOL/L (ref 7–16)
AST SERPL-CCNC: 18 U/L (ref 12–45)
BASOPHILS # BLD AUTO: 0.3 % (ref 0–1.8)
BASOPHILS # BLD: 0.02 K/UL (ref 0–0.12)
BILIRUB SERPL-MCNC: 0.2 MG/DL (ref 0.1–1.5)
BUN SERPL-MCNC: 18 MG/DL (ref 8–22)
CALCIUM ALBUM COR SERPL-MCNC: 9 MG/DL (ref 8.5–10.5)
CALCIUM SERPL-MCNC: 9.3 MG/DL (ref 8.5–10.5)
CHLORIDE SERPL-SCNC: 102 MMOL/L (ref 96–112)
CHOLEST SERPL-MCNC: 224 MG/DL (ref 100–199)
CO2 SERPL-SCNC: 23 MMOL/L (ref 20–33)
CREAT SERPL-MCNC: 0.68 MG/DL (ref 0.5–1.4)
EOSINOPHIL # BLD AUTO: 0.21 K/UL (ref 0–0.51)
EOSINOPHIL NFR BLD: 3.6 % (ref 0–6.9)
ERYTHROCYTE [DISTWIDTH] IN BLOOD BY AUTOMATED COUNT: 46.3 FL (ref 35.9–50)
EST. AVERAGE GLUCOSE BLD GHB EST-MCNC: 108 MG/DL
FASTING STATUS PATIENT QL REPORTED: NORMAL
GFR SERPLBLD CREATININE-BSD FMLA CKD-EPI: 91 ML/MIN/1.73 M 2
GLOBULIN SER CALC-MCNC: 2.7 G/DL (ref 1.9–3.5)
GLUCOSE SERPL-MCNC: 96 MG/DL (ref 65–99)
HBA1C MFR BLD: 5.4 % (ref 4–5.6)
HCT VFR BLD AUTO: 40.5 % (ref 37–47)
HDLC SERPL-MCNC: 41 MG/DL
HGB BLD-MCNC: 13.3 G/DL (ref 12–16)
IMM GRANULOCYTES # BLD AUTO: 0.01 K/UL (ref 0–0.11)
IMM GRANULOCYTES NFR BLD AUTO: 0.2 % (ref 0–0.9)
LDLC SERPL CALC-MCNC: 132 MG/DL
LYMPHOCYTES # BLD AUTO: 1.73 K/UL (ref 1–4.8)
LYMPHOCYTES NFR BLD: 29.6 % (ref 22–41)
MCH RBC QN AUTO: 31.3 PG (ref 27–33)
MCHC RBC AUTO-ENTMCNC: 32.8 G/DL (ref 32.2–35.5)
MCV RBC AUTO: 95.3 FL (ref 81.4–97.8)
MONOCYTES # BLD AUTO: 0.42 K/UL (ref 0–0.85)
MONOCYTES NFR BLD AUTO: 7.2 % (ref 0–13.4)
NEUTROPHILS # BLD AUTO: 3.45 K/UL (ref 1.82–7.42)
NEUTROPHILS NFR BLD: 59.1 % (ref 44–72)
NRBC # BLD AUTO: 0 K/UL
NRBC BLD-RTO: 0 /100 WBC (ref 0–0.2)
PLATELET # BLD AUTO: 235 K/UL (ref 164–446)
PMV BLD AUTO: 10 FL (ref 9–12.9)
POTASSIUM SERPL-SCNC: 5 MMOL/L (ref 3.6–5.5)
PROT SERPL-MCNC: 7.1 G/DL (ref 6–8.2)
RBC # BLD AUTO: 4.25 M/UL (ref 4.2–5.4)
SODIUM SERPL-SCNC: 138 MMOL/L (ref 135–145)
TRIGL SERPL-MCNC: 255 MG/DL (ref 0–149)
TSH SERPL DL<=0.005 MIU/L-ACNC: 4.08 UIU/ML (ref 0.38–5.33)
WBC # BLD AUTO: 5.8 K/UL (ref 4.8–10.8)

## 2024-04-08 PROCEDURE — 84443 ASSAY THYROID STIM HORMONE: CPT

## 2024-04-08 PROCEDURE — 83036 HEMOGLOBIN GLYCOSYLATED A1C: CPT

## 2024-04-08 PROCEDURE — 80053 COMPREHEN METABOLIC PANEL: CPT

## 2024-04-08 PROCEDURE — 85025 COMPLETE CBC W/AUTO DIFF WBC: CPT

## 2024-04-08 PROCEDURE — 80061 LIPID PANEL: CPT

## 2024-04-08 PROCEDURE — 36415 COLL VENOUS BLD VENIPUNCTURE: CPT

## 2024-04-16 ENCOUNTER — OFFICE VISIT (OUTPATIENT)
Dept: MEDICAL GROUP | Facility: MEDICAL CENTER | Age: 74
End: 2024-04-16
Payer: MEDICARE

## 2024-04-16 VITALS
SYSTOLIC BLOOD PRESSURE: 90 MMHG | HEIGHT: 68 IN | HEART RATE: 74 BPM | WEIGHT: 183 LBS | BODY MASS INDEX: 27.74 KG/M2 | OXYGEN SATURATION: 94 % | DIASTOLIC BLOOD PRESSURE: 70 MMHG | TEMPERATURE: 97.9 F

## 2024-04-16 DIAGNOSIS — E78.2 MIXED HYPERLIPIDEMIA: ICD-10-CM

## 2024-04-16 PROCEDURE — 99214 OFFICE O/P EST MOD 30 MIN: CPT | Performed by: PHYSICIAN ASSISTANT

## 2024-04-16 PROCEDURE — 3074F SYST BP LT 130 MM HG: CPT | Performed by: PHYSICIAN ASSISTANT

## 2024-04-16 PROCEDURE — 3078F DIAST BP <80 MM HG: CPT | Performed by: PHYSICIAN ASSISTANT

## 2024-04-16 RX ORDER — ROSUVASTATIN CALCIUM 5 MG/1
TABLET, COATED ORAL
Qty: 60 TABLET | Refills: 3 | Status: SHIPPED | OUTPATIENT
Start: 2024-04-16

## 2024-04-16 ASSESSMENT — FIBROSIS 4 INDEX: FIB4 SCORE: 1.44

## 2024-04-16 NOTE — PROGRESS NOTES
"Subjective:   Chrissy Willoughby is a 73 y.o. female here today for     HPI: Patient is here to discuss:  Problem   Mixed Hyperlipidemia    4/16/24  The 10-year ASCVD risk score (Kobi JOE, et al., 2019) is: 7%  No family hx of CAD  Taking zetia 10mg. Atorvastatin in past caused bone pain/muscle pain. Will start rosuvastatin 5mg every other evening            Current medicines (including changes today)  Current Outpatient Medications   Medication Sig Dispense Refill    rosuvastatin (CRESTOR) 5 MG Tab Take 1 tablet by mouth every other night 60 Tablet 3    gabapentin (NEURONTIN) 300 MG Cap Take 300 mg by mouth 2 times a day as needed.      ezetimibe (ZETIA) 10 MG Tab Take 1 Tablet by mouth every day. 100 Tablet 3    ZENPEP [id number] units Cap DR Particles Take  by mouth every day. Enzymes-lipase, amylase      sertraline (ZOLOFT) 100 MG Tab Take 1 Tablet by mouth every day. 90 Tablet 3    estradiol (ESTRACE) 0.5 MG tablet Take 1 Tablet by mouth every day. 30 Tablet 2    omeprazole (PRILOSEC) 20 MG delayed-release capsule Take 20 mg by mouth as needed.      vitamin D (CHOLECALCIFEROL) 1000 UNIT Tab Take 1 Tablet by mouth every day.       No current facility-administered medications for this visit.     She  has a past medical history of Anesthesia, Bronchitis (2013), Cataract, Heart burn, Hiatus hernia syndrome, High cholesterol, History of anemia, Indigestion, Other specified disorder of intestines, Pain, PONV (postoperative nausea and vomiting), Psychiatric problem, and Renal disorder.  Codeine     Social History and Family History were reviewed and updated.    ROS   No headaches, chest pain, no shortness of breath, abdominal pain, nausea, or vomiting.  All other systems were reviewed and are negative or noted as positive in the HPI.       Objective:     BP 90/70 (BP Location: Left arm, Patient Position: Sitting, BP Cuff Size: Adult)   Pulse 74   Temp 36.6 °C (97.9 °F) (Temporal)   Ht 1.727 m (5' 8\")   Wt 83 " kg (183 lb)   SpO2 94%  Body mass index is 27.83 kg/m².     Physical Exam:  General: Patient appears well-nourished, well-hydrated, nontoxic  HEENT, normocephalic atraumatic, PERRLA, extraocular movements intact, nares are patent and clear  Neck: No visible masses, thyromegaly or abnormalities noted  Cardiovascular.  Sitting comfortably without visible signs of edema  Lungs: No cyanosis noted, nondyspneic  Skin: Well perfused without evidence of rash or lesions  Neurological: Cranial nerves II through XII intact, normal gait  Musculoskeletal: Normal range of motion, normal strength and no deficit noted     Clinical Course/Lab Analysis:     Latest Reference Range & Units 04/08/24 09:43   WBC 4.8 - 10.8 K/uL 5.8   RBC 4.20 - 5.40 M/uL 4.25   Hemoglobin 12.0 - 16.0 g/dL 13.3   Hematocrit 37.0 - 47.0 % 40.5   MCV 81.4 - 97.8 fL 95.3   MCH 27.0 - 33.0 pg 31.3   MCHC 32.2 - 35.5 g/dL 32.8   RDW 35.9 - 50.0 fL 46.3   Platelet Count 164 - 446 K/uL 235   MPV 9.0 - 12.9 fL 10.0   Neutrophils-Polys 44.00 - 72.00 % 59.10   Neutrophils (Absolute) 1.82 - 7.42 K/uL 3.45   Lymphocytes 22.00 - 41.00 % 29.60   Lymphs (Absolute) 1.00 - 4.80 K/uL 1.73   Monocytes 0.00 - 13.40 % 7.20   Monos (Absolute) 0.00 - 0.85 K/uL 0.42   Eosinophils 0.00 - 6.90 % 3.60   Eos (Absolute) 0.00 - 0.51 K/uL 0.21   Basophils 0.00 - 1.80 % 0.30   Baso (Absolute) 0.00 - 0.12 K/uL 0.02   Immature Granulocytes 0.00 - 0.90 % 0.20   Immature Granulocytes (abs) 0.00 - 0.11 K/uL 0.01   Nucleated RBC 0.00 - 0.20 /100 WBC 0.00   NRBC (Absolute) K/uL 0.00   Sodium 135 - 145 mmol/L 138   Potassium 3.6 - 5.5 mmol/L 5.0   Chloride 96 - 112 mmol/L 102   Co2 20 - 33 mmol/L 23   Anion Gap 7.0 - 16.0  13.0   Glucose 65 - 99 mg/dL 96   Bun 8 - 22 mg/dL 18   Creatinine 0.50 - 1.40 mg/dL 0.68   GFR (CKD-EPI) >60 mL/min/1.73 m 2 91   Calcium 8.5 - 10.5 mg/dL 9.3   Correct Calcium 8.5 - 10.5 mg/dL 9.0   AST(SGOT) 12 - 45 U/L 18   ALT(SGPT) 2 - 50 U/L 15   Alkaline Phosphatase  30 - 99 U/L 48   Total Bilirubin 0.1 - 1.5 mg/dL 0.2   Albumin 3.2 - 4.9 g/dL 4.4   Total Protein 6.0 - 8.2 g/dL 7.1   Globulin 1.9 - 3.5 g/dL 2.7   A-G Ratio g/dL 1.6   Glycohemoglobin 4.0 - 5.6 % 5.4   Estim. Avg Glu mg/dL 108   Fasting Status  Fasting   Cholesterol,Tot 100 - 199 mg/dL 224 (H)   Triglycerides 0 - 149 mg/dL 255 (H)   HDL >=40 mg/dL 41   LDL <100 mg/dL 132 (H)   TSH 0.380 - 5.330 uIU/mL 4.080   (H): Data is abnormally high    Assessment and Plan:   The following treatment plan was discussed.  Signs and symptoms for which to return were discussed with patient at length.  Patient verbalized understanding.    Problem List Items Addressed This Visit       Mixed hyperlipidemia     Chronic and unstable  4/16/24  The 10-year ASCVD risk score (Kobi DK, et al., 2019) is: 7%  No family hx of CAD  Taking zetia 10mg. Atorvastatin in past caused bone pain/muscle pain. Will start rosuvastatin 5mg every other evening  Cholesterol,Tot 100 - 199 mg/dL 224 (H)   Triglycerides 0 - 149 mg/dL 255 (H)   HDL >=40 mg/dL 41   LDL <100 mg/dL 132 (H)          Relevant Medications    rosuvastatin (CRESTOR) 5 MG Tab    Other Relevant Orders    Comp Metabolic Panel    Lipid Profile          Followup: 6 weeks    Please note that this dictation was created using voice recognition software. I have made every reasonable attempt to correct obvious errors, but I expect that there are errors of grammar and possibly content that I did not discover before finalizing the note.

## 2024-04-16 NOTE — ASSESSMENT & PLAN NOTE
Chronic and unstable  4/16/24  The 10-year ASCVD risk score (Kobi JOE, et al., 2019) is: 7%  No family hx of CAD  Taking zetia 10mg. Atorvastatin in past caused bone pain/muscle pain. Will start rosuvastatin 5mg every other evening  Cholesterol,Tot 100 - 199 mg/dL 224 (H)   Triglycerides 0 - 149 mg/dL 255 (H)   HDL >=40 mg/dL 41   LDL <100 mg/dL 132 (H)

## 2024-06-18 ENCOUNTER — HOSPITAL ENCOUNTER (OUTPATIENT)
Dept: LAB | Facility: MEDICAL CENTER | Age: 74
End: 2024-06-18
Attending: PHYSICIAN ASSISTANT
Payer: MEDICARE

## 2024-06-18 DIAGNOSIS — E78.2 MIXED HYPERLIPIDEMIA: ICD-10-CM

## 2024-06-18 LAB
ALBUMIN SERPL BCP-MCNC: 4.3 G/DL (ref 3.2–4.9)
ALBUMIN/GLOB SERPL: 1.4 G/DL
ALP SERPL-CCNC: 52 U/L (ref 30–99)
ALT SERPL-CCNC: 19 U/L (ref 2–50)
ANION GAP SERPL CALC-SCNC: 9 MMOL/L (ref 7–16)
AST SERPL-CCNC: 18 U/L (ref 12–45)
BILIRUB SERPL-MCNC: 0.2 MG/DL (ref 0.1–1.5)
BUN SERPL-MCNC: 19 MG/DL (ref 8–22)
CALCIUM ALBUM COR SERPL-MCNC: 8.6 MG/DL (ref 8.5–10.5)
CALCIUM SERPL-MCNC: 8.8 MG/DL (ref 8.4–10.2)
CHLORIDE SERPL-SCNC: 104 MMOL/L (ref 96–112)
CHOLEST SERPL-MCNC: 154 MG/DL (ref 100–199)
CO2 SERPL-SCNC: 24 MMOL/L (ref 20–33)
CREAT SERPL-MCNC: 0.69 MG/DL (ref 0.5–1.4)
FASTING STATUS PATIENT QL REPORTED: NORMAL
GFR SERPLBLD CREATININE-BSD FMLA CKD-EPI: 91 ML/MIN/1.73 M 2
GLOBULIN SER CALC-MCNC: 3.1 G/DL (ref 1.9–3.5)
GLUCOSE SERPL-MCNC: 102 MG/DL (ref 65–99)
HDLC SERPL-MCNC: 41 MG/DL
LDLC SERPL CALC-MCNC: 59 MG/DL
POTASSIUM SERPL-SCNC: 4.5 MMOL/L (ref 3.6–5.5)
PROT SERPL-MCNC: 7.4 G/DL (ref 6–8.2)
SODIUM SERPL-SCNC: 137 MMOL/L (ref 135–145)
TRIGL SERPL-MCNC: 268 MG/DL (ref 0–149)

## 2024-06-18 PROCEDURE — 80061 LIPID PANEL: CPT

## 2024-06-18 PROCEDURE — 80053 COMPREHEN METABOLIC PANEL: CPT

## 2024-06-18 PROCEDURE — 36415 COLL VENOUS BLD VENIPUNCTURE: CPT

## 2024-06-24 ENCOUNTER — HOSPITAL ENCOUNTER (OUTPATIENT)
Dept: LAB | Facility: MEDICAL CENTER | Age: 74
End: 2024-06-24
Attending: OBSTETRICS & GYNECOLOGY
Payer: MEDICARE

## 2024-06-24 LAB — 25(OH)D3 SERPL-MCNC: 56 NG/ML (ref 30–100)

## 2024-06-24 PROCEDURE — 82652 VIT D 1 25-DIHYDROXY: CPT

## 2024-06-24 PROCEDURE — 36415 COLL VENOUS BLD VENIPUNCTURE: CPT

## 2024-06-24 PROCEDURE — 82306 VITAMIN D 25 HYDROXY: CPT

## 2024-06-25 ENCOUNTER — RX ONLY (OUTPATIENT)
Age: 74
Setting detail: RX ONLY
End: 2024-06-25

## 2024-06-25 RX ORDER — VALACYCLOVIR 1 G/1
1 TABLET, FILM COATED ORAL QD
Qty: 30 | Refills: 6 | Status: ERX | COMMUNITY
Start: 2024-06-25

## 2024-06-26 LAB — 1,25(OH)2D3 SERPL-MCNC: 43.2 PG/ML (ref 19.9–79.3)

## 2024-08-01 ENCOUNTER — TELEPHONE (OUTPATIENT)
Dept: HEALTH INFORMATION MANAGEMENT | Facility: OTHER | Age: 74
End: 2024-08-01
Payer: MEDICARE

## 2024-09-30 ENCOUNTER — APPOINTMENT (OUTPATIENT)
Dept: RADIOLOGY | Facility: MEDICAL CENTER | Age: 74
End: 2024-09-30
Attending: PHYSICAL MEDICINE & REHABILITATION
Payer: MEDICARE

## 2024-11-11 ENCOUNTER — HOSPITAL ENCOUNTER (OUTPATIENT)
Dept: RADIOLOGY | Facility: MEDICAL CENTER | Age: 74
End: 2024-11-11
Attending: PHYSICAL MEDICINE & REHABILITATION
Payer: MEDICARE

## 2024-11-11 ENCOUNTER — TELEPHONE (OUTPATIENT)
Dept: HEALTH INFORMATION MANAGEMENT | Facility: OTHER | Age: 74
End: 2024-11-11
Payer: MEDICARE

## 2024-11-11 DIAGNOSIS — L60.0 INGROWN TOENAIL: ICD-10-CM

## 2024-11-11 DIAGNOSIS — M54.50 LOW BACK PAIN, UNSPECIFIED BACK PAIN LATERALITY, UNSPECIFIED CHRONICITY, UNSPECIFIED WHETHER SCIATICA PRESENT: ICD-10-CM

## 2024-11-11 PROCEDURE — 72148 MRI LUMBAR SPINE W/O DYE: CPT

## 2024-11-11 NOTE — TELEPHONE ENCOUNTER
1. Caller Name: Chrissy Willoughby      Call Back Number: [phone]      How would the patient prefer to be contacted with a response: [employer] message    2. SPECIFIC Action To Be Taken: Referral pending, please sign.    3. Diagnosis/Clinical Reason for Request: painful ingrown toenail    4. Specialty & Provider Name/Lab/Imaging Location: N/A    5. Is appointment scheduled for requested order/referral: no    Patient was informed they will receive a return phone call from the office ONLY if there are any questions before processing their request. Advised to call back if they haven't received a call from the referral department in 5 days.    Original Fluency msg below:      From:Chrissy Willoughby       Sent:11/8/2024  4:13 PM PST         To:Patient Referral Message Message List    Subject:Referral Request    Chrissy Willoughby would like to request a referral.  Reason: painful ingrown toenail  Requested provider: podiatrist  Comment:  Please refer me to a podiatrist. I am in the process of losing 4 toenails from hiking this past July, and my left BIG TOE has become ingrown instead of coming off. It aches at night so bad it wakes me up. I need help removing it. Please do this ASAP.

## 2024-11-15 ENCOUNTER — APPOINTMENT (OUTPATIENT)
Dept: RADIOLOGY | Facility: MEDICAL CENTER | Age: 74
End: 2024-11-15
Attending: PHYSICAL MEDICINE & REHABILITATION
Payer: MEDICARE

## 2025-01-09 ENCOUNTER — APPOINTMENT (OUTPATIENT)
Dept: MEDICAL GROUP | Facility: MEDICAL CENTER | Age: 75
End: 2025-01-09
Payer: MEDICARE

## 2025-01-09 VITALS
TEMPERATURE: 96.9 F | HEART RATE: 94 BPM | HEIGHT: 68 IN | SYSTOLIC BLOOD PRESSURE: 116 MMHG | OXYGEN SATURATION: 93 % | BODY MASS INDEX: 29.28 KG/M2 | DIASTOLIC BLOOD PRESSURE: 68 MMHG | WEIGHT: 193.2 LBS

## 2025-01-09 DIAGNOSIS — M54.50 CHRONIC BILATERAL LOW BACK PAIN WITHOUT SCIATICA: ICD-10-CM

## 2025-01-09 DIAGNOSIS — E78.2 MIXED HYPERLIPIDEMIA: ICD-10-CM

## 2025-01-09 DIAGNOSIS — G89.29 CHRONIC BILATERAL LOW BACK PAIN WITHOUT SCIATICA: ICD-10-CM

## 2025-01-09 DIAGNOSIS — G43.109 OCULAR MIGRAINE: ICD-10-CM

## 2025-01-09 DIAGNOSIS — R74.8 ELEVATED PANCREATIC ENZYME: ICD-10-CM

## 2025-01-09 DIAGNOSIS — Z98.1 HISTORY OF SPINAL FUSION: ICD-10-CM

## 2025-01-09 DIAGNOSIS — R10.13 EPIGASTRIC PAIN: ICD-10-CM

## 2025-01-09 DIAGNOSIS — R73.09 ELEVATED GLUCOSE: ICD-10-CM

## 2025-01-09 DIAGNOSIS — R51.9 NONINTRACTABLE HEADACHE, UNSPECIFIED CHRONICITY PATTERN, UNSPECIFIED HEADACHE TYPE: ICD-10-CM

## 2025-01-09 PROCEDURE — 99214 OFFICE O/P EST MOD 30 MIN: CPT | Performed by: PHYSICIAN ASSISTANT

## 2025-01-09 PROCEDURE — 3078F DIAST BP <80 MM HG: CPT | Performed by: PHYSICIAN ASSISTANT

## 2025-01-09 PROCEDURE — 3074F SYST BP LT 130 MM HG: CPT | Performed by: PHYSICIAN ASSISTANT

## 2025-01-09 PROCEDURE — G2211 COMPLEX E/M VISIT ADD ON: HCPCS | Performed by: PHYSICIAN ASSISTANT

## 2025-01-09 RX ORDER — CYCLOBENZAPRINE HCL 10 MG
10 TABLET ORAL
COMMUNITY
Start: 2024-11-25

## 2025-01-09 RX ORDER — TOPIRAMATE 25 MG/1
25 TABLET, FILM COATED ORAL DAILY
Qty: 30 TABLET | Refills: 3 | Status: SHIPPED | OUTPATIENT
Start: 2025-01-09

## 2025-01-09 RX ORDER — VALACYCLOVIR HYDROCHLORIDE 1 G/1
TABLET, FILM COATED ORAL
COMMUNITY

## 2025-01-09 RX ORDER — TRAMADOL HYDROCHLORIDE 50 MG/1
TABLET ORAL
COMMUNITY

## 2025-01-09 RX ORDER — ONDANSETRON 4 MG/1
TABLET, ORALLY DISINTEGRATING ORAL
COMMUNITY

## 2025-01-09 ASSESSMENT — FIBROSIS 4 INDEX: FIB4 SCORE: 1.3

## 2025-01-09 NOTE — PROGRESS NOTES
Subjective:     History of Present Illness  The patient is a 74-year-old female who presents for evaluation of ocular migraines, pancreatic enzyme insufficiency, and back pain.    She has been experiencing severe headaches, described as a flash of lightning predominantly affecting the left side of her brain. She has no prior history of headaches. She also reports visual disturbances, including seeing tin foil stars and yellow spots, which she attributes to ocular migraines. These episodes, which began in 1997 following a fall, occur 2 to 3 times daily and last approximately 15 minutes. She has recently noticed a slight tremor in her hands. She has no history of seizures. She manages her migraines with Advil and rest. She has undergone recent eye examinations, including cataract surgery and macular degeneration testing, all of which were normal. A previous neurological evaluation, including head x-rays, suggested possible mini TIAs and white matter loss.    She has a history of pancreatic enzyme insufficiency, for which she is currently on Zenpep, taking 10 to 12 capsules daily with each meal and snack. Prior to this diagnosis, she experienced an 18-month period of alternating diarrhea and constipation, which contributed to her decision to retire. Her gastroenterologist was in the process of investigating these symptoms when he retired. She subsequently sought care from [facility], where she was diagnosed with pancreatic enzyme insufficiency. She has a scheduled follow-up with her gastroenterologist next month. Her current treatment regimen includes Zenpep, which she finds effective in controlling her diarrhea. She was initially prescribed a generic medication by her primary care physician, but this caused bone pain. She then switched to a new gastroenterologist who prescribed Zenpep, a costly medication. She managed to obtain Medicare coverage for Zenpep until she reached the donut hole, at which  point she had to pay $ 474 per month out of pocket.    She underwent an anterior lumbar fusion at L4-L5-S1 18 years ago. In recent years, she has experienced a recurrence of lower back pain, leading her to seek care from [facility]. She was informed that spinal fusions typically last about 12 years and that her age precludes repeat surgery. She has received epidural injections and trigger point injections and has increased her gabapentin dosage. She had knee surgery a few years ago and now experiences difficulty with weight-bearing on her right side, similar to a permanent shin splint. She has been advised that this may be related to her L3 vertebra. She is scheduled to see Dr. Ignacio Hall, a neurosurgeon, on 01/28/2025. She was previously very active but has been limited by her constant diarrhea and leg symptoms. She also reports difficulty standing up from a wheelchair or soft chair without assistance. She has been told that she has some bone spurs. She is currently on gabapentin 600 mg at night for sleep due to back and knee pain, and 300 mg in the morning and afternoon.    She is on sertraline and does not want any change in dose. She is on generic Crestor. She takes AREDS for her eyes, vitamin D3 gummies, and calcium because she had a DEXA scan done by her OB-GYN and she was told she has osteopenia. She has taken magnesium before.    FAMILY HISTORY  Her mother and 2 brothers had macular degeneration.    MEDICATIONS  Zenpep, sertraline, gabapentin, Crestor, Advil, AREDS, vitamin D3 gummies, calcium, magnesium (past).      Current medicines (including changes today)  Current Outpatient Medications   Medication Sig Dispense Refill    cyclobenzaprine (FLEXERIL) 10 mg Tab Take 10 mg by mouth at bedtime.      valacyclovir (VALTREX) 1 GM Tab TAKE 2 TABS BY MOUTH NOW. THEN, TAKE 2 TABS BY MOUTH IN TWELVE HOURS DURING FLARES. IF IT CONTINUES, TAKE 1 TAB BY MOUTH ONCE A DAY.      topiramate (TOPAMAX) 25 MG Tab  "Take 1 Tablet by mouth every day. 30 Tablet 3    rosuvastatin (CRESTOR) 5 MG Tab Take 1 tablet by mouth every other night 60 Tablet 3    gabapentin (NEURONTIN) 300 MG Cap Take 300 mg by mouth 2 times a day as needed.      ZENPEP 75129-269332 units Cap DR Particles Take  by mouth every day. Enzymes-lipase, amylase      sertraline (ZOLOFT) 100 MG Tab Take 1 Tablet by mouth every day. 90 Tablet 3    estradiol (ESTRACE) 0.5 MG tablet Take 1 Tablet by mouth every day. 30 Tablet 2    omeprazole (PRILOSEC) 20 MG delayed-release capsule Take 20 mg by mouth as needed.      vitamin D (CHOLECALCIFEROL) 1000 UNIT Tab Take 1 Tablet by mouth every day.      ondansetron (ZOFRAN ODT) 4 MG TABLET DISPERSIBLE DISSOLVE ONE TABLET IN MOUTH EVERY TWELVE HOURS AS NEEDED FOR NAUSEA / VOMITING (Patient not taking: Reported on 1/9/2025)      traMADol (ULTRAM) 50 MG Tab TAKE ONE OR TWO TABLETS BY MOUTH EVERY EIGHT HOURS AS NEEDED FOR MODERATE PAIN FOR 7 DAYS (Patient not taking: Reported on 1/9/2025)      ezetimibe (ZETIA) 10 MG Tab Take 1 Tablet by mouth every day. (Patient not taking: Reported on 1/9/2025) 100 Tablet 3     No current facility-administered medications for this visit.     She  has a past medical history of Anesthesia, Bronchitis (2013), Cataract, Heart burn, Hiatus hernia syndrome, High cholesterol, History of anemia, Indigestion, Other specified disorder of intestines, Pain, PONV (postoperative nausea and vomiting), Psychiatric problem, and Renal disorder.    ROS   No chest pain, no shortness of breath, no abdominal pain  Positive ROS as per HPI.  All other systems reviewed and are negative.     Objective:     /68   Pulse 94   Temp 36.1 °C (96.9 °F) (Temporal)   Ht 1.727 m (5' 8\")   Wt 87.6 kg (193 lb 3.2 oz)   SpO2 93%  Body mass index is 29.38 kg/m².   Physical Exam    Constitutional: Alert, no distress.  Skin: Warm, dry, good turgor, no rashes in visible areas.  Eye: Equal, round and reactive, conjunctiva " clear, lids normal.  ENMT: Lips without lesions, good dentition, oropharynx clear.  Neck: Trachea midline, no masses, no thyromegaly. No cervical or supraclavicular lymphadenopathy  Respiratory: Unlabored respiratory effort, lungs clear to auscultation, no wheezes, no ronchi.  Cardiovascular: Normal S1, S2, no murmur, no edema.  Abdomen: Soft, non-tender, no masses, no hepatosplenomegaly.  Psych: Alert and oriented x3, normal affect and mood.      Results          Assessment and Plan:   The following treatment plan was discussed    Assessment & Plan  1. Ocular migraines.  She reports experiencing ocular migraines with visual disturbances lasting about 15 minutes, occurring frequently over the past year. A prescription for topiramate 25 mg once daily has been provided, with instructions to increase the dosage to 50 mg if no side effects are observed and symptoms persist. If the medication proves ineffective, it will be discontinued. A referral to neurology has been made for further evaluation. A CT scan of the head without contrast has been ordered to rule out any underlying conditions.    2. Pancreatic enzyme insufficiency.  She is currently on Zenpep, taking 10-12 capsules daily with meals and snacks. Blood tests for lipase and amylase have been ordered to monitor her condition. She will continue her current regimen of Zenpep.    3. Back pain.  She has a history of anterior lumbar fusion at L4-L5-S1 and is experiencing recurrent lower back pain. She has had epidurals, trigger injections, and increasing doses of gabapentin. She is scheduled to see a neurosurgeon on 01/28/2025 for further evaluation. She will continue her current gabapentin regimen, taking 600 mg at night and 300 mg in the morning and afternoon.    4. Health maintenance.  Annual labs have been ordered, including cholesterol, sugars, liver, kidney, and metabolic panel. She will continue her current medications, including sertraline and generic  Crestor.    Follow-up  The patient will follow up in 4 to 6 weeks.    PROCEDURE  The patient underwent an anterior lumbar fusion at L4-L5-S1 18 years ago. She has also received epidural injections and trigger point injections in the past. Additionally, she had knee surgery a few years ago.      ORDERS:  1. Mixed hyperlipidemia    - Lipid Profile; Future  - LIPASE; Future  - AMYLASE; Future    2. Epigastric pain      3. Elevated pancreatic enzyme      4. Elevated glucose    - Comp Metabolic Panel; Future  - HEMOGLOBIN A1C; Future    5. Ocular migraine    - Referral to Neurology  - topiramate (TOPAMAX) 25 MG Tab; Take 1 Tablet by mouth every day.  Dispense: 30 Tablet; Refill: 3    6. Nonintractable headache, unspecified chronicity pattern, unspecified headache type    - CT-HEAD W/O; Future    7. Chronic bilateral low back pain without sciatica      8. History of spinal fusion      () Today's E/M visit is associated with medical care services that serve as the continuing focal point for all needed health care services and/or with medical care services that are part of ongoing care related to a patient's single, serious condition, or a complex condition: This includes furnishing services to patients on an ongoing basis that result in care that is personalized to the patient. The services result in a comprehensive, longitudinal, and continuous relationship with the patient and involve delivery of team-based care that is accessible, coordinated with other practitioners and providers, and integrated with the broader health care landscape.     Anticipatory guidance discussed at length including regular daily exercise with a goal of 150 minutes a week.  Recommendation to eat the Mediterranean diet to decrease cardiovascular risk.  Encouraged avoiding high fructose corn syrup and other simple sugars to reduce risk of obesity and type 2 diabetes.    Follow Up: 4 weeks    Please note that this dictation was created using  voice recognition software. I have made every reasonable attempt to correct obvious errors, but I expect that there are errors of grammar and possibly content that I did not discover before finalizing the note.      Attestation      Verbal consent was acquired by the patient to use [employer] ambient listening note generation during this visit Yes

## 2025-01-15 ENCOUNTER — TELEPHONE (OUTPATIENT)
Dept: HEALTH INFORMATION MANAGEMENT | Facility: OTHER | Age: 75
End: 2025-01-15
Payer: MEDICARE

## 2025-01-20 ENCOUNTER — HOSPITAL ENCOUNTER (OUTPATIENT)
Dept: RADIOLOGY | Facility: MEDICAL CENTER | Age: 75
End: 2025-01-20
Attending: PHYSICIAN ASSISTANT
Payer: MEDICARE

## 2025-01-20 DIAGNOSIS — R51.9 NONINTRACTABLE HEADACHE, UNSPECIFIED CHRONICITY PATTERN, UNSPECIFIED HEADACHE TYPE: ICD-10-CM

## 2025-01-20 PROCEDURE — 70450 CT HEAD/BRAIN W/O DYE: CPT

## 2025-01-21 ENCOUNTER — HOSPITAL ENCOUNTER (OUTPATIENT)
Dept: LAB | Facility: MEDICAL CENTER | Age: 75
End: 2025-01-21
Attending: PHYSICIAN ASSISTANT
Payer: MEDICARE

## 2025-01-21 DIAGNOSIS — E78.2 MIXED HYPERLIPIDEMIA: ICD-10-CM

## 2025-01-21 DIAGNOSIS — R73.09 ELEVATED GLUCOSE: ICD-10-CM

## 2025-01-21 LAB
EST. AVERAGE GLUCOSE BLD GHB EST-MCNC: 117 MG/DL
HBA1C MFR BLD: 5.7 % (ref 4–5.6)

## 2025-01-21 PROCEDURE — 83690 ASSAY OF LIPASE: CPT

## 2025-01-21 PROCEDURE — 82150 ASSAY OF AMYLASE: CPT

## 2025-01-21 PROCEDURE — 36415 COLL VENOUS BLD VENIPUNCTURE: CPT

## 2025-01-21 PROCEDURE — 80053 COMPREHEN METABOLIC PANEL: CPT

## 2025-01-21 PROCEDURE — 80061 LIPID PANEL: CPT

## 2025-01-21 PROCEDURE — 83036 HEMOGLOBIN GLYCOSYLATED A1C: CPT

## 2025-01-22 LAB
ALBUMIN SERPL BCP-MCNC: 4.6 G/DL (ref 3.2–4.9)
ALBUMIN/GLOB SERPL: 1.9 G/DL
ALP SERPL-CCNC: 45 U/L (ref 30–99)
ALT SERPL-CCNC: 22 U/L (ref 2–50)
AMYLASE SERPL-CCNC: 29 U/L (ref 20–103)
ANION GAP SERPL CALC-SCNC: 12 MMOL/L (ref 7–16)
AST SERPL-CCNC: 20 U/L (ref 12–45)
BILIRUB SERPL-MCNC: 0.2 MG/DL (ref 0.1–1.5)
BUN SERPL-MCNC: 23 MG/DL (ref 8–22)
CALCIUM ALBUM COR SERPL-MCNC: 8.2 MG/DL (ref 8.5–10.5)
CALCIUM SERPL-MCNC: 8.7 MG/DL (ref 8.5–10.5)
CHLORIDE SERPL-SCNC: 108 MMOL/L (ref 96–112)
CHOLEST SERPL-MCNC: 188 MG/DL (ref 100–199)
CO2 SERPL-SCNC: 23 MMOL/L (ref 20–33)
CREAT SERPL-MCNC: 0.75 MG/DL (ref 0.5–1.4)
FASTING STATUS PATIENT QL REPORTED: NORMAL
GFR SERPLBLD CREATININE-BSD FMLA CKD-EPI: 83 ML/MIN/1.73 M 2
GLOBULIN SER CALC-MCNC: 2.4 G/DL (ref 1.9–3.5)
GLUCOSE SERPL-MCNC: 97 MG/DL (ref 65–99)
HDLC SERPL-MCNC: 47 MG/DL
LDLC SERPL CALC-MCNC: 106 MG/DL
LIPASE SERPL-CCNC: 27 U/L (ref 11–82)
POTASSIUM SERPL-SCNC: 4.9 MMOL/L (ref 3.6–5.5)
PROT SERPL-MCNC: 7 G/DL (ref 6–8.2)
SODIUM SERPL-SCNC: 143 MMOL/L (ref 135–145)
TRIGL SERPL-MCNC: 175 MG/DL (ref 0–149)

## 2025-01-23 ENCOUNTER — HOSPITAL ENCOUNTER (OUTPATIENT)
Dept: RADIOLOGY | Facility: MEDICAL CENTER | Age: 75
End: 2025-01-23
Attending: NEUROLOGICAL SURGERY
Payer: MEDICARE

## 2025-01-23 DIAGNOSIS — M54.50 LUMBAR PAIN: ICD-10-CM

## 2025-01-23 PROCEDURE — 72110 X-RAY EXAM L-2 SPINE 4/>VWS: CPT

## 2025-02-20 ENCOUNTER — OFFICE VISIT (OUTPATIENT)
Dept: MEDICAL GROUP | Age: 75
End: 2025-02-20
Payer: MEDICARE

## 2025-02-20 VITALS
TEMPERATURE: 96.8 F | OXYGEN SATURATION: 95 % | HEART RATE: 79 BPM | BODY MASS INDEX: 30.16 KG/M2 | HEIGHT: 68 IN | WEIGHT: 199 LBS | DIASTOLIC BLOOD PRESSURE: 74 MMHG | SYSTOLIC BLOOD PRESSURE: 122 MMHG

## 2025-02-20 DIAGNOSIS — R73.03 PREDIABETES: ICD-10-CM

## 2025-02-20 DIAGNOSIS — E78.2 MIXED HYPERLIPIDEMIA: ICD-10-CM

## 2025-02-20 DIAGNOSIS — Z98.1 HISTORY OF SPINAL FUSION: ICD-10-CM

## 2025-02-20 DIAGNOSIS — M54.50 CHRONIC BILATERAL LOW BACK PAIN WITHOUT SCIATICA: ICD-10-CM

## 2025-02-20 DIAGNOSIS — G89.29 CHRONIC BILATERAL LOW BACK PAIN WITHOUT SCIATICA: ICD-10-CM

## 2025-02-20 DIAGNOSIS — M85.851 OSTEOPENIA OF NECK OF RIGHT FEMUR: ICD-10-CM

## 2025-02-20 DIAGNOSIS — G43.109 OCULAR MIGRAINE: ICD-10-CM

## 2025-02-20 PROCEDURE — 99214 OFFICE O/P EST MOD 30 MIN: CPT | Performed by: PHYSICIAN ASSISTANT

## 2025-02-20 PROCEDURE — G2211 COMPLEX E/M VISIT ADD ON: HCPCS | Performed by: PHYSICIAN ASSISTANT

## 2025-02-20 PROCEDURE — 3074F SYST BP LT 130 MM HG: CPT | Performed by: PHYSICIAN ASSISTANT

## 2025-02-20 PROCEDURE — 3078F DIAST BP <80 MM HG: CPT | Performed by: PHYSICIAN ASSISTANT

## 2025-02-20 RX ORDER — ROSUVASTATIN CALCIUM 10 MG/1
10 TABLET, COATED ORAL EVERY EVENING
Qty: 100 TABLET | Refills: 3 | Status: SHIPPED | OUTPATIENT
Start: 2025-02-20 | End: 2026-03-27

## 2025-02-20 ASSESSMENT — PATIENT HEALTH QUESTIONNAIRE - PHQ9: CLINICAL INTERPRETATION OF PHQ2 SCORE: 0

## 2025-02-20 ASSESSMENT — FIBROSIS 4 INDEX: FIB4 SCORE: 1.34

## 2025-02-20 NOTE — PROGRESS NOTES
Subjective:     History of Present Illness  The patient is a 74-year-old female who presents for evaluation of cholesterol management, prediabetes, osteopenia, back pain, headaches, and visual migraines.    She has been managing her cholesterol levels with Crestor, having discontinued ezetimibe due to its ineffectiveness. She experienced adverse effects, including pain and muscle aches, from atorvastatin in the past. Her total cholesterol level is currently 188, an increase from 154 in April 2024.    Her blood glucose level is 117, the highest it has been recorded. She is concerned about her GFR, which has consistently been 91 but has recently decreased to 83.    She underwent a bone density scan approximately 10 years ago, which revealed osteopenia in one of her joints. However, her vertebrae were found to be normal. She has been supplementing with vitamin D and has recently started calcium supplementation. She had a hip surgery in 2019, during which the psoas tendon was cut and reattached. She reports feeling weak on both sides of her body and has to lean against the bed to put on her pants or shoes.    She consulted with Dr. Hall regarding back surgery and is scheduled to receive an epidural injection on Tuesday. A procedure is planned for 04/07/2025 at [facility], which involves creating a small hole at L3-L4 and filling it with grafts. She has been prescribed gabapentin 1200 mg daily for back and leg pain, divided into 600 mg at night and 300 mg twice during the day. However, she had to abruptly stop taking it for 2 weeks due to a prescription issue, which resulted in severe headaches. She plans to discuss this with her doctor during her upcoming epidural injection appointment.    She underwent a CT scan for her headaches, which showed no abnormalities. Since starting Topamax, she has not experienced any further headaches.    She has not experienced any more visual migraines since starting  "Topamax.    Supplemental Information  She had a colonoscopy in either September or October 2024, which showed no abnormalities. She is due for another colonoscopy in 10 years.    MEDICATIONS  Current: Crestor, Topamax, gabapentin  Discontinued: ezetimibe, atorvastatin  .    Current medicines (including changes today)  Current Outpatient Medications   Medication Sig Dispense Refill    rosuvastatin (CRESTOR) 10 MG Tab Take 1 Tablet by mouth every evening. 100 Tablet 3    cyclobenzaprine (FLEXERIL) 10 mg Tab Take 10 mg by mouth at bedtime.      valacyclovir (VALTREX) 1 GM Tab TAKE 2 TABS BY MOUTH NOW. THEN, TAKE 2 TABS BY MOUTH IN TWELVE HOURS DURING FLARES. IF IT CONTINUES, TAKE 1 TAB BY MOUTH ONCE A DAY.      topiramate (TOPAMAX) 25 MG Tab Take 1 Tablet by mouth every day. 30 Tablet 3    ZENPEP 52218-193501 units Cap DR Particles Take  by mouth every day. Enzymes-lipase, amylase      sertraline (ZOLOFT) 100 MG Tab Take 1 Tablet by mouth every day. 90 Tablet 3    estradiol (ESTRACE) 0.5 MG tablet Take 1 Tablet by mouth every day. 30 Tablet 2    omeprazole (PRILOSEC) 20 MG delayed-release capsule Take 20 mg by mouth as needed.      vitamin D (CHOLECALCIFEROL) 1000 UNIT Tab Take 1 Tablet by mouth every day.       No current facility-administered medications for this visit.     She  has a past medical history of Anesthesia, Bronchitis (2013), Cataract, Heart burn, Hiatus hernia syndrome, High cholesterol, History of anemia, Indigestion, Other specified disorder of intestines, Pain, PONV (postoperative nausea and vomiting), Psychiatric problem, and Renal disorder.    ROS   No chest pain, no shortness of breath, no abdominal pain  Positive ROS as per HPI.  All other systems reviewed and are negative.     Objective:     /74   Pulse 79   Temp 36 °C (96.8 °F) (Temporal)   Ht 1.727 m (5' 8\")   Wt 90.3 kg (199 lb)   SpO2 95%  Body mass index is 30.26 kg/m².   Physical Exam    Constitutional: Alert, no " distress.  Skin: Warm, dry, good turgor, no rashes in visible areas.  Eye: Equal, round and reactive, conjunctiva clear, lids normal.  ENMT: Lips without lesions, good dentition, oropharynx clear.  Neck: Trachea midline, no masses, no thyromegaly. No cervical or supraclavicular lymphadenopathy  Respiratory: Unlabored respiratory effort, lungs clear to auscultation, no wheezes, no ronchi.  Cardiovascular: Normal S1, S2, no murmur, no edema.  Abdomen: Soft, non-tender, no masses, no hepatosplenomegaly.  Psych: Alert and oriented x3, normal affect and mood.      Results  Laboratory Studies  Amylase increased from 20 to 29. Lipase increased from 19 to 27. Triglycerides 175. . HDL 47. Total cholesterol 188. Blood glucose 117. Glycohemoglobin 5.7%. GFR 83.     Latest Reference Range & Units 01/21/25 10:42   Sodium 135 - 145 mmol/L 143   Potassium 3.6 - 5.5 mmol/L 4.9   Chloride 96 - 112 mmol/L 108   Co2 20 - 33 mmol/L 23   Anion Gap 7.0 - 16.0  12.0   Glucose 65 - 99 mg/dL 97   Bun 8 - 22 mg/dL 23 (H)   Creatinine 0.50 - 1.40 mg/dL 0.75   GFR (CKD-EPI) >60 mL/min/1.73 m 2 83   Calcium 8.5 - 10.5 mg/dL 8.7   Correct Calcium 8.5 - 10.5 mg/dL 8.2 (L)   AST(SGOT) 12 - 45 U/L 20   ALT(SGPT) 2 - 50 U/L 22   Alkaline Phosphatase 30 - 99 U/L 45   Total Bilirubin 0.1 - 1.5 mg/dL 0.2   Albumin 3.2 - 4.9 g/dL 4.6   Total Protein 6.0 - 8.2 g/dL 7.0   Globulin 1.9 - 3.5 g/dL 2.4   A-G Ratio g/dL 1.9   Lipase 11 - 82 U/L 27   Amylase 20 - 103 U/L 29   Glycohemoglobin 4.0 - 5.6 % 5.7 (H)   Estim. Avg Glu mg/dL 117   Fasting Status  Fasting   Cholesterol,Tot 100 - 199 mg/dL 188   Triglycerides 0 - 149 mg/dL 175 (H)   HDL >=40 mg/dL 47   LDL <100 mg/dL 106 (H)   (H): Data is abnormally high  (L): Data is abnormally low    Assessment and Plan:   The following treatment plan was discussed    Assessment & Plan  1. Cholesterol management.  Her triglyceride levels have shown a decrease since June 2024, while her LDL levels have  increased. The total cholesterol level is within the acceptable range. The dosage of Crestor will be escalated from 5 mg to 10 mg daily. A re-evaluation of her cholesterol levels will be conducted in 6 months.    2. Prediabetes.  Her glycohemoglobin level is 5.7%, indicating a prediabetic state. She is advised to reduce her carbohydrate intake.    3. Osteopenia.  She is currently on a regimen of calcium 1200 mg and vitamin D 600 IU.    4. Back pain.  She is scheduled for an epidural injection on Tuesday and a procedure on 04/07/2025 at [facility]. A prescription for gabapentin 600 mg twice daily will be provided.    5. Headaches.  Topamax has been effective in managing her headaches. However, it may be contributing to the change in her GFR. A re-evaluation of her GFR will be conducted.    6. Visual migraines.  Topamax has been effective in managing her visual migraines.    PROCEDURE  The patient underwent hip surgery in 2019, during which the psoas tendon was cut and reattached. She had a colonoscopy in either September or October 2024, which showed no abnormalities.      () Today's E/M visit is associated with medical care services that serve as the continuing focal point for all needed health care services and/or with medical care services that are part of ongoing care related to a patient's single, serious condition, or a complex condition: This includes furnishing services to patients on an ongoing basis that result in care that is personalized to the patient. The services result in a comprehensive, longitudinal, and continuous relationship with the patient and involve delivery of team-based care that is accessible, coordinated with other practitioners and providers, and integrated with the broader health care landscape.       Chrissy was seen today for lab results and follow-up.    Diagnoses and all orders for this visit:    Mixed hyperlipidemia  -     rosuvastatin (CRESTOR) 10 MG Tab; Take 1  Tablet by mouth every evening.    Prediabetes  -     Comp Metabolic Panel; Future  -     HEMOGLOBIN A1C; Future    History of spinal fusion    Chronic bilateral low back pain without sciatica    Osteopenia of neck of right femur    Ocular migraine      () Today's E/M visit is associated with medical care services that serve as the continuing focal point for all needed health care services and/or with medical care services that are part of ongoing care related to a patient's single, serious condition, or a complex condition: This includes furnishing services to patients on an ongoing basis that result in care that is personalized to the patient. The services result in a comprehensive, longitudinal, and continuous relationship with the patient and involve delivery of team-based care that is accessible, coordinated with other practitioners and providers, and integrated with the broader health care landscape.       Anticipatory guidance discussed at length including regular daily exercise with a goal of 150 minutes a week.  Recommendation to eat the Mediterranean diet to decrease cardiovascular risk.  Encouraged avoiding high fructose corn syrup and other simple sugars to reduce risk of obesity and type 2 diabetes.    Follow Up: 6 months  annual    Please note that this dictation was created using voice recognition software. I have made every reasonable attempt to correct obvious errors, but I expect that there are errors of grammar and possibly content that I did not discover before finalizing the note.      Attestation      Verbal consent was acquired by the patient to use [employer] ambient listening note generation during this visit Yes

## 2025-02-28 ENCOUNTER — TELEPHONE (OUTPATIENT)
Dept: NEUROLOGY | Facility: MEDICAL CENTER | Age: 75
End: 2025-02-28
Payer: MEDICARE

## 2025-02-28 NOTE — PROGRESS NOTES
[facility]  GENERAL NEUROLOGY  NEW PATIENT VISIT    Referral source: Kathy BOOGIE    CC: Ocular migraine    HISTORY OF ILLNESS:  Chrissy Willoughby is a 74 y.o. woman with a history most notable for migraine.  She presented to her primary provider with complaints of migraines with flashing light aura.These started in 1997 after falling out of a golf cart.   She was referred to neurology for further evaluation.  Today, Chrissy provided the following history:    Migraine description:  Starts with one or the other eye she will see shapes. She will see stars, zig zags, or tin foil. She reports that she her half vision toward the nose that is blurry. These episodes can last for 5-15 minutes. Originally these were once in a while but in the last 4-5 months it has been daily multiple times a day. She reports no change in her lifestyle prior to these increasing.  Associated symptoms: on time she reports loss of words. Triggers: unknown. Denies being able to trigger with sudden position change, bending, position change, laughing, crying, sneezing, or coughing. Denies double vision. Visual change occurs any time of day usually with rest.  says that she snores. She is will rested after a full night sleep. She is unsure if she grinds her teeth or clenches her jaw. She is sleepy throughout the day. She does startle herself awake at night and sometimes will have to take a deep breath.     She denies any history of migraines.       The following is a summary of headache symptoms, presented in my standard format:    Family History: no   Age at onset (years): 1997 after falling out of a golf cart  Location: see above  Radiation: see above  Frequency: see above  Duration: see above  Headache Days/Month: November 30/30, December 30/30, January 30 /30, February 20/30  Quality: see above  Intensity: see above  Aura: see above  Photophobia/Phonophobia/Nausea/Vomiting: no,no,no,no  Provoked by Physical Activity?: no    Triggers: see above   Associated Symptoms: see above   Autonomic Signs (such as ptosis, miosis, conjunctival injection, rhinorrhea, increased lacrimation): no   Head Trauma: 1997 golf accident, hit head through wall, walked into a 2x4  Association with Menses: no   ED Visits: no   Hospitalizations:   Missed Work Days (retired ): push through  Sleep (hours/night): 8-9 hours   Caffeine Intake: 1 cup of coffee a day   Hydration: yes- needs improvement   Nutrition: occasionally skips meals   Exercise: does not trigger  Analgesic Overuse: Advil migraine 2 tablets maybe twice a month    Current Medication Regimen:  - Topamax 25  - gabapentin    Medications Tried: Response  Preventive:  -     Rescue:  -     Medications Not Tried:  -     MEDICAL AND SURGICAL HISTORY:  Past Medical History:   Diagnosis Date    Anesthesia     post op N/V, takes longer to wake up, sister woke up once during surgery,     Bronchitis 2013    Cataract     Heart burn     no meds    Hiatus hernia syndrome     Surgery to fix herina 2018    High cholesterol     History of anemia     Indigestion     Other specified disorder of intestines     Pain     neck, L hip    PONV (postoperative nausea and vomiting)     Psychiatric problem     depression   controlled with medication     Renal disorder     STONES     Past Surgical History:   Procedure Laterality Date    MENISCECTOMY, KNEE, ARTHROSCOPIC Right 6/27/2023    Procedure: RIGHT KNEE ARTHROSCOPY, PARTIAL MEDIAL PARTIAL LATERAL MENISCECTOMY AND LIMITED SiNLVECTOMY;  Surgeon: Benigno Porras M.D.;  Location: [facility];  Service: Orthopedics    SHOULDER ARTHROSCOPY Left 12/2021    CATARACT EXTRACTION WITH IOL Bilateral 04/2021    HIP ARTHROSCOPY Left 11/04/2019    Procedure: ARTHROSCOPY, HIP - W/DEBRIDEMENT, PSOAS RELEASE AND MCQUEEN;  Surgeon: Otis Lane M.D.;  Location: [facility];  Service: Orthopedics    SYNOVECTOMY Left 11/04/2019    Procedure: SYNOVECTOMY;  Surgeon:  Otis Lane M.D.;  Location: [facility];  Service: Orthopedics    FEMORAL NECK OSTEOPLASTY Left 11/04/2019    Procedure: OSTEOPLASTY, FEMUR, NECK;  Surgeon: Otis Lane M.D.;  Location: [facility];  Service: Orthopedics    ACETABULAR OSTEOPLASTY Left 11/04/2019    Procedure: OSTEOPLASTY, ACETABULUM;  Surgeon: Otis Lane M.D.;  Location: [facility];  Service: Orthopedics    HIATAL HERNIA REPAIR  2018    PTOSIS REPAIR Bilateral 07/29/2015    Procedure: PTOSIS REPAIR-UPPER LID (POSTERIOR APPROACH);  Surgeon: Terence Nguyen M.D.;  Location: [facility];  Service:     TENSILON TEST  02/19/2015    Performed by Feliberto Pyle D.O. at ENDOSCOPY [facility] ORS    MODIFIED NIRSCHEL PROCEDURE  06/06/2013    Performed by Cate Alcaraz M.D. at SURGERY SAME DAY [facility] ORS    SHOULDER DECOMPRESSION ARTHROSCOPIC  06/06/2013    Performed by Cate Alcaraz M.D. at SURGERY SAME DAY [facility] ORS    ELBOW EXPLORATION Right 2013    CERVICAL DISK AND FUSION ANTERIOR  10/20/2009    Performed by CATE ALCARAZ at SURGERY [facility] ORS    MENISCECTOMY Left 2009    left     ROTATOR CUFF REPAIR  2005    left     LUMBAR FUSION ANTERIOR  2005    HYSTERECTOMY LAPAROSCOPY      OTHER ORTHOPEDIC SURGERY      lumbar fusion anterior     MEDICATIONS:  Current Outpatient Medications   Medication Sig    rosuvastatin (CRESTOR) 10 MG Tab Take 1 Tablet by mouth every evening.    cyclobenzaprine (FLEXERIL) 10 mg Tab Take 10 mg by mouth at bedtime.    valacyclovir (VALTREX) 1 GM Tab TAKE 2 TABS BY MOUTH NOW. THEN, TAKE 2 TABS BY MOUTH IN TWELVE HOURS DURING FLARES. IF IT CONTINUES, TAKE 1 TAB BY MOUTH ONCE A DAY.    topiramate (TOPAMAX) 25 MG Tab Take 1 Tablet by mouth every day.    ZENPEP 97710-407002 units Cap DR Particles Take  by mouth every day. Enzymes-lipase, amylase    sertraline (ZOLOFT) 100 MG Tab Take 1 Tablet by mouth every day.    estradiol (ESTRACE) 0.5  "MG tablet Take 1 Tablet by mouth every day.    omeprazole (PRILOSEC) 20 MG delayed-release capsule Take 20 mg by mouth as needed.    vitamin D (CHOLECALCIFEROL) 1000 UNIT Tab Take 1 Tablet by mouth every day.     SOCIAL HISTORY:  Social History     Tobacco Use    Smoking status: Never    Smokeless tobacco: Never   Substance Use Topics    Alcohol use: Yes     Comment: about 2 bottles a month     Social History     Social History Narrative    Not on file     FAMILY HISTORY:  No family history on file.    Ambulatory Vitals  Encounter Vitals  Blood Pressure : 110/64  Pulse: 74  Weight: 90.3 kg (199 lb 1.2 oz)  Height: 172.7 cm (5' 8\")  BMI (Calculated): 30.27     REVIEW OF SYSTEMS:  A ROS was completed.  Pertinent positives and negatives were included in the HPI, above.  All other systems were reviewed and are negative.    PHYSICAL EXAM:  General/Medical:  Chrissy presents clean and well-dressed.  She does not appear in any acute distress at this time.  She has no malar rash.  She has limited range of motion of her neck due to multiple shoulder surgeries and neck issues.  She reports no jaw claudication or jaw pain.  She reports no allodynia.  She has no upper or lower extremity edema.  She has palpable radial pulses.  She has prolonged capillary refill in the upper extremities.  Auscultation of her heart revealed S1 and S2 with no abnormal sounds.  Vital signs are listed above and are within normal limits.    Neuro:  MENTAL STATUS: awake and alert; no deficits of speech or language; oriented to person, place, and time; affect was appropriate to situation    CRANIAL NERVES:    II: acuity: J1+/J3, fields: intact to confrontation, pupils: 3/3 to 2/2 without a relative afferent pupillary defect, discs: sharp    III/IV/VI: versions: intact with right and left gaze nystagmus    V: facial sensation: symmetric to light touch    VII: facial expression: symmetric    VIII: hearing: intact to finger rub    IX/X: palate: elevates " "symmetrically    XI: shoulder shrug: symmetric    XII: tongue: midline    MOTOR:  - bulk: normal throughout  - tone: normal throughout  Upper Extremity Strength  (R/L)    4+/4+   Elbow flexion 4+/4+   Elbow extension 4+/4+   Shoulder abduction 4+/4+     Lower Extremity Strength  (R/L)   Hip flexion 4-/4-   Knee extension 4-/4-   Knee flexion 4-/4-   Ankle plantarflexion 4-/4-   Ankle dorsiflexion 4-/4-     - pronator drift: absent  - abnormal movements: none    SENSATION:  - light touch: intact  - vibration:inconsistent throughout  - temperature:intact  - pinprick: NT  - proprioception: NT  - Romberg: deferred     COORDINATION:  - finger to nose: normal, no ataxia on exam  - finger tapping: rapid and accurate, bilaterally  - foot tapping:slow with low amplitude, bilaterally   - foot inversion/ eversion: limited range of motion, bilterally  - clonus:none    REFLEXES:  Reflex Right Left   BR 1+ 1+   Biceps 1+ 1+   Triceps 1+ 1+   Patellae 1+ 1+   Achilles 1+ 1+   Toes NT NT     GAIT:  - narrow base and normal, with normal arm swing  - heel-walk: deferred   - toe-walk: deferred  - tandem gait: deferred    REVIEW OF IMAGING STUDIES: I reviewed the following studies:  MRI Brain:  Date: 10/17/2014  W/o and w/ contrast?:  With and without  Indication: \"Myasthenia gravis\"  Comparison: None  Impression:  1.  No acute abnormality.   2.  There is no exophthalmus.   3.  There is no enlargement of the extraocular muscles.   4.  Mild cerebral atrophy.   5.  A few nonspecific T2 hyperintensities in the subcortical and periventricular white matter likely representing nonspecific gliosis, chronic ischemic foci of demyelination.   6.  Mild mucosal thickening in the bilateral maxillary sinuses.     REVIEW OF LABORATORY STUDIES:  1/21/2025 lipid panel WNL except triglycerides 175   1/22/2025 CMP WNL except BUN 23, corrected calcium 8.2  1/21/2025 hemoglobin A1c 5.7    ASSESSMENT& PLAN:  1. Acephalgic migraine  Chrissy " presents to establish with a neurology provider for ongoing treatment for acephalic migraine.  She reports her acephalic migraines started abruptly after she fell out the back of a golf cart in 1997 hitting her head.  At first they were very infrequent but coming every day in the last few months.  She denies any headache after only having visual changes for 5 to 15 minutes sometimes multiple times a day.  She did present to her primary provider who put her on Topamax 25 mg.  She reports since starting Topamax she has not had an acephalic migraine.  Her neurological exam revealed: Weakness in the upper and lower extremities, diminished reflexes, inconsistent vibration, and slight right left gaze nystagmus.  She had an MRI in 2024 which showed some T2 hyperintensities in the subcortical and periventricular white matter.  She did have a head CT January of this year which showed no evidence of acute cerebral infarction, hemorrhage, or mass lesion.  There is no need to repeat these images at this time may reconsider in the future.  We did discuss that since she is happy on Topamax and it seems to be helping her acephalic migraines that we do not necessarily have to take her off of the medication.  She does have a possible history of kidney stones so we did discuss that Topamax could increase the likelihood of kidney stones in the future.  We also discussed that Topamax can cause brain fog and she does report having some memory issues in the last month.  We could potentially take her off of Topamax and try a different medication such as a G pants like Qulipta, Emgality, Ajovy, or Aimovig.  For now she would like to stay on Topamax and we will keep an eye out for increasing cognitive dysfunction and possible kidney stones.  We did discuss over-the-counter supplements for migraine prevention listed below.  She can contact me via [employer] with any updates, concerns, or questions.  Otherwise she will follow-up with me in 6  months to discuss her current medication regimen and its effect on her aura.  - topiramate (TOPAMAX) 25 MG Tab; Take 1 Tablet by mouth every evening.  Dispense: 90 Tablet; Refill: 3   Try supplementing:  - magnesium: 400-600 mg once or 200-300 mg twice daily  - riboflavin (vitamin B2): 400 mg once daily  - coenzyme Q10: 300 mg daily       BILLING DOCUMENTATION:   I spent 65 minutes in patient care. This includes time with chart review, pre-charting, records review, discussions with other healthcare providers, and documentation. This also includes face-to-face time with the patient for: exam review, discussion and treatment planning.      Helen Olvera MSN APRN-CNP  [facility] [place]

## 2025-03-04 ENCOUNTER — OFFICE VISIT (OUTPATIENT)
Dept: NEUROLOGY | Facility: MEDICAL CENTER | Age: 75
End: 2025-03-04
Payer: MEDICARE

## 2025-03-04 ENCOUNTER — TELEPHONE (OUTPATIENT)
Dept: PHARMACY | Facility: MEDICAL CENTER | Age: 75
End: 2025-03-04

## 2025-03-04 ENCOUNTER — TELEPHONE (OUTPATIENT)
Dept: HEALTH INFORMATION MANAGEMENT | Facility: OTHER | Age: 75
End: 2025-03-04

## 2025-03-04 VITALS
WEIGHT: 199.08 LBS | DIASTOLIC BLOOD PRESSURE: 64 MMHG | BODY MASS INDEX: 30.17 KG/M2 | HEIGHT: 68 IN | SYSTOLIC BLOOD PRESSURE: 110 MMHG | HEART RATE: 74 BPM

## 2025-03-04 DIAGNOSIS — G43.109 ACEPHALGIC MIGRAINE: ICD-10-CM

## 2025-03-04 PROCEDURE — 99205 OFFICE O/P NEW HI 60 MIN: CPT

## 2025-03-04 PROCEDURE — 99212 OFFICE O/P EST SF 10 MIN: CPT

## 2025-03-04 PROCEDURE — 3078F DIAST BP <80 MM HG: CPT

## 2025-03-04 PROCEDURE — 3074F SYST BP LT 130 MM HG: CPT

## 2025-03-04 RX ORDER — GABAPENTIN 300 MG/1
300 CAPSULE ORAL 3 TIMES DAILY
COMMUNITY

## 2025-03-04 RX ORDER — TOPIRAMATE 25 MG/1
25 TABLET, FILM COATED ORAL NIGHTLY
Qty: 90 TABLET | Refills: 3 | Status: SHIPPED | OUTPATIENT
Start: 2025-03-04

## 2025-03-04 RX ORDER — TOPIRAMATE 25 MG/1
25 TABLET, FILM COATED ORAL DAILY
Qty: 90 TABLET | Refills: 3 | Status: SHIPPED | OUTPATIENT
Start: 2025-03-04 | End: 2025-03-04

## 2025-03-04 ASSESSMENT — FIBROSIS 4 INDEX: FIB4 SCORE: 1.34

## 2025-03-04 ASSESSMENT — PATIENT HEALTH QUESTIONNAIRE - PHQ9: CLINICAL INTERPRETATION OF PHQ2 SCORE: 0

## 2025-03-04 NOTE — PATIENT INSTRUCTIONS
Try supplementing:  - magnesium: 400 mg  - riboflavin (vitamin B2): 400 mg once daily order through [facility] or [facility]   - coenzyme Q10: 300 mg daily

## 2025-03-04 NOTE — TELEPHONE ENCOUNTER
Received [employer]  request via [facility]  for   topiramate (TOPAMAX) 25 MG Tab. (Quantity:90, Day Supply:90)     Insurance: [employer]  Member ID:  [id number]  BIN: [id number]  PCN: [id number]  Group: [id number]     Ran Test claim via [place] & medication RTC [facility] - will release to pt's preferred pharmacy on file.

## 2025-03-17 NOTE — PROGRESS NOTES
Patient had request for medical clearance sent over by [facility].  She will be having an L3-L4 laminectomy.  This will be done by Dr. Ignacio mccord on April 7, 2025.  I have just seen the patient February 20, 2025 and reviewed all labs and diagnoses.  She is not on any blood thinners.  She is doing well.  She is controlling her prediabetes well and her mixed hyperlipidemia.  I did sign the form saying she is low risk for surgery and she can go ahead and move forward.  Please see scanned section.  We did fax the form to [facility] today, March 17, 2025

## 2025-05-27 ENCOUNTER — HOSPITAL ENCOUNTER (OUTPATIENT)
Dept: RADIOLOGY | Facility: MEDICAL CENTER | Age: 75
End: 2025-05-27
Attending: STUDENT IN AN ORGANIZED HEALTH CARE EDUCATION/TRAINING PROGRAM
Payer: MEDICARE

## 2025-05-27 DIAGNOSIS — R31.9 LOIN PAIN-HEMATURIA SYNDROME: ICD-10-CM

## 2025-05-27 DIAGNOSIS — M54.50 LOIN PAIN-HEMATURIA SYNDROME: ICD-10-CM

## 2025-05-27 PROCEDURE — 72100 X-RAY EXAM L-S SPINE 2/3 VWS: CPT

## 2025-07-02 ENCOUNTER — HOSPITAL ENCOUNTER (OUTPATIENT)
Dept: LAB | Facility: MEDICAL CENTER | Age: 75
End: 2025-07-02
Attending: PHYSICIAN ASSISTANT
Payer: MEDICARE

## 2025-07-02 DIAGNOSIS — R73.03 PREDIABETES: ICD-10-CM

## 2025-07-02 LAB
ALBUMIN SERPL BCP-MCNC: 4.2 G/DL (ref 3.2–4.9)
ALBUMIN/GLOB SERPL: 1.6 G/DL
ALP SERPL-CCNC: 45 U/L (ref 30–99)
ALT SERPL-CCNC: 20 U/L (ref 2–50)
ANION GAP SERPL CALC-SCNC: 11 MMOL/L (ref 7–16)
AST SERPL-CCNC: 24 U/L (ref 12–45)
BILIRUB SERPL-MCNC: <0.2 MG/DL (ref 0.1–1.5)
BUN SERPL-MCNC: 21 MG/DL (ref 8–22)
CALCIUM ALBUM COR SERPL-MCNC: 8.8 MG/DL (ref 8.5–10.5)
CALCIUM SERPL-MCNC: 9 MG/DL (ref 8.5–10.5)
CHLORIDE SERPL-SCNC: 108 MMOL/L (ref 96–112)
CO2 SERPL-SCNC: 23 MMOL/L (ref 20–33)
CREAT SERPL-MCNC: 0.8 MG/DL (ref 0.5–1.4)
EST. AVERAGE GLUCOSE BLD GHB EST-MCNC: 111 MG/DL
GFR SERPLBLD CREATININE-BSD FMLA CKD-EPI: 77 ML/MIN/1.73 M 2
GLOBULIN SER CALC-MCNC: 2.6 G/DL (ref 1.9–3.5)
GLUCOSE SERPL-MCNC: 98 MG/DL (ref 65–99)
HBA1C MFR BLD: 5.5 % (ref 4–5.6)
POTASSIUM SERPL-SCNC: 4.8 MMOL/L (ref 3.6–5.5)
PROT SERPL-MCNC: 6.8 G/DL (ref 6–8.2)
SODIUM SERPL-SCNC: 142 MMOL/L (ref 135–145)

## 2025-07-02 PROCEDURE — 83036 HEMOGLOBIN GLYCOSYLATED A1C: CPT

## 2025-07-02 PROCEDURE — 36415 COLL VENOUS BLD VENIPUNCTURE: CPT

## 2025-07-02 PROCEDURE — 80053 COMPREHEN METABOLIC PANEL: CPT

## 2025-08-07 ENCOUNTER — APPOINTMENT (OUTPATIENT)
Dept: MEDICAL GROUP | Age: 75
End: 2025-08-07
Payer: MEDICARE

## 2025-08-07 PROBLEM — I70.0 ATHEROSCLEROSIS OF AORTA (HCC): Status: ACTIVE | Noted: 2025-08-07

## 2025-08-07 ASSESSMENT — FIBROSIS 4 INDEX: FIB4 SCORE: 1.74

## (undated) DEVICE — NEPTUNE 4 PORT MANIFOLD - (20/PK)

## (undated) DEVICE — SHAVER HIP 8 FLUTE XL 5.5MM (5EA/BX)

## (undated) DEVICE — CHLORAPREP 26 ML APPLICATOR - ORANGE TINT(25/CA)

## (undated) DEVICE — TOWELS CLOTH SURGICAL - (4/PK 20PK/CA)

## (undated) DEVICE — DRAPE STRLE REG TOWEL 18X24 - (10/BX 4BX/CA)"

## (undated) DEVICE — FORMULA HIP RESECTOR XL (5EA/BX)

## (undated) DEVICE — MASK ANESTHESIA ADULT  - (100/CA)

## (undated) DEVICE — GOWN SURGICAL X-LARGE ULTRA - FILM-REINFORCED (20/CA)

## (undated) DEVICE — GLOVE BIOGEL INDICATOR SZ 8 SURGICAL PF LTX - (50/BX 4BX/CA)

## (undated) DEVICE — PADDING CAST 6 IN STERILE - 6 X 4 YDS (24/CA)

## (undated) DEVICE — TOWEL STOP TIMEOUT SAFETY FLAG (40EA/CA)

## (undated) DEVICE — TUBING CASSETTE CROSSFLOW INTEGRATED (10EA/CA)

## (undated) DEVICE — STAPLER SKIN DISP - (6/BX 10BX/CA) VISISTAT

## (undated) DEVICE — WATER IRRIGATION STERILE 1000ML (12EA/CA)

## (undated) DEVICE — Device

## (undated) DEVICE — BAG, SPONGE COUNT 50600

## (undated) DEVICE — SODIUM CHL IRRIGATION 0.9% 1000ML (12EA/CA)

## (undated) DEVICE — SUTURE GENERAL

## (undated) DEVICE — SUCTION INSTRUMENT YANKAUER BULBOUS TIP W/O VENT (50EA/CA)

## (undated) DEVICE — DRAPE C-ARM LARGE 41IN X 74 IN - (10/BX 2BX/CA)

## (undated) DEVICE — DRESSING XEROFORM 1X8 - (50/BX 4BX/CA)

## (undated) DEVICE — GLOVE BIOGEL SZ 7.5 SURGICAL PF LTX - (50PR/BX 4BX/CA)

## (undated) DEVICE — DRAPE LARGE 3 QUARTER - (20/CA)

## (undated) DEVICE — TUBING DAY USE W/CARTRIDGE (10EA/BX)

## (undated) DEVICE — SODIUM CHL. IRRIGATION 0.9% 3000ML (4EA/CA 65CA/PF)

## (undated) DEVICE — BLADE SHAVER BONECUTTER 4.5MM DSP (3EA/BX)

## (undated) DEVICE — KIT ANESTHESIA W/CIRCUIT & 3/LT BAG W/FILTER (20EA/CA)

## (undated) DEVICE — SUTURE 3-0 ETHILON FS-1 - (36/BX) 30 INCH

## (undated) DEVICE — PROBE VULCAN INTEGRA ABLATOR - E-FLEX

## (undated) DEVICE — COVER LIGHT HANDLE FLEXIBLE - SOFT (2EA/PK 80PK/CA)

## (undated) DEVICE — SHAVER4.0 AGGRESSIVE + FORMLA (5EA/BX)

## (undated) DEVICE — TUBE CONNECTING SUCTION - CLEAR PLASTIC STERILE 72 IN (50EA/CA)

## (undated) DEVICE — SENSOR SPO2 NEO LNCS ADHESIVE (20/BX) SEE USER NOTES

## (undated) DEVICE — GLOVE BIOGEL PI ORTHO SZ 9 PF LF

## (undated) DEVICE — GLOVE PROTEXIS PI MICRO SZ 8.5 (200PR/CA)

## (undated) DEVICE — DRESSING INTEGUSEAL MICROBIAL SEALANT IS100 (20EA/CA)

## (undated) DEVICE — ELECTRODE DUAL RETURN W/ CORD - (50/PK)

## (undated) DEVICE — SENSOR OXIMETER ADULT SPO2 RD SET (20EA/BX)

## (undated) DEVICE — DRAPE U ORTHOPEDIC - (10/BX)

## (undated) DEVICE — HEAD HOLDER JUNIOR/ADULT

## (undated) DEVICE — GOWN WARMING STANDARD FLEX - (30/CA)

## (undated) DEVICE — BUR HIP AGGRESSIVE PLUS STRIGHT LONG 5.0MM (5EA/BX)

## (undated) DEVICE — LACTATED RINGERS INJ 1000 ML - (14EA/CA 60CA/PF)

## (undated) DEVICE — PROTECTOR ULNA NERVE - (36PR/CA)

## (undated) DEVICE — PACK KNEE ARTHROSCOPY SM OR - (2EA/CA)

## (undated) DEVICE — PACK SHOULDER ARTHROSCOPY SM - (2EA/CA)

## (undated) DEVICE — ARTHROWAND TURBOVAC 3.5/90 SCT

## (undated) DEVICE — GLOVE, LITE (PAIR)

## (undated) DEVICE — BLADE BEAVER 6400 MINI EYE ROUND TIP SHARP ON ONE SIDE (20/CA)

## (undated) DEVICE — TAPE CLOTH MEDIPORE 6 INCH - (12RL/CA)

## (undated) DEVICE — SHAVER PLUS ELITE LONG INCISOR 4.5MM (3EA/BX)

## (undated) DEVICE — ELECTRODE 850 FOAM ADHESIVE - HYDROGEL RADIOTRNSPRNT (50/PK)

## (undated) DEVICE — GLOVE SURGICAL PROTEXIS 8 1/2 - (50PR/BX)

## (undated) DEVICE — DRAPE VERTICAL ISOLATION - (10EA/CA)

## (undated) DEVICE — HUMID-VENT HEAT AND MOISTURE EXCHANGE- (50/BX)

## (undated) DEVICE — KIT ROOM DECONTAMINATION

## (undated) DEVICE — CANISTER SUCTION RIGID RED 1500CC (40EA/CA)

## (undated) DEVICE — SPONGE GAUZESTER 4 X 4 4PLY - (128PK/CA)